# Patient Record
Sex: FEMALE | Race: BLACK OR AFRICAN AMERICAN | NOT HISPANIC OR LATINO | Employment: FULL TIME | URBAN - METROPOLITAN AREA
[De-identification: names, ages, dates, MRNs, and addresses within clinical notes are randomized per-mention and may not be internally consistent; named-entity substitution may affect disease eponyms.]

---

## 2017-04-12 ENCOUNTER — HOSPITAL ENCOUNTER (EMERGENCY)
Facility: HOSPITAL | Age: 42
Discharge: HOME | End: 2017-04-12
Attending: EMERGENCY MEDICINE
Payer: COMMERCIAL

## 2017-04-12 ENCOUNTER — HOSPITAL ENCOUNTER (EMERGENCY)
Facility: OTHER | Age: 42
Discharge: LEFT BEFORE DISCHARGE | End: 2017-04-12
Attending: EMERGENCY MEDICINE
Payer: COMMERCIAL

## 2017-04-12 VITALS
RESPIRATION RATE: 18 BRPM | HEIGHT: 63 IN | BODY MASS INDEX: 23.92 KG/M2 | OXYGEN SATURATION: 99 % | SYSTOLIC BLOOD PRESSURE: 188 MMHG | HEART RATE: 72 BPM | DIASTOLIC BLOOD PRESSURE: 91 MMHG | WEIGHT: 135 LBS | TEMPERATURE: 98 F

## 2017-04-12 VITALS
RESPIRATION RATE: 18 BRPM | WEIGHT: 135 LBS | HEIGHT: 63 IN | HEART RATE: 84 BPM | DIASTOLIC BLOOD PRESSURE: 75 MMHG | BODY MASS INDEX: 23.92 KG/M2 | TEMPERATURE: 99 F | OXYGEN SATURATION: 95 % | SYSTOLIC BLOOD PRESSURE: 148 MMHG

## 2017-04-12 DIAGNOSIS — R10.2 PELVIC PAIN AFFECTING PREGNANCY: ICD-10-CM

## 2017-04-12 DIAGNOSIS — I10 ESSENTIAL HYPERTENSION: ICD-10-CM

## 2017-04-12 DIAGNOSIS — O26.899 PELVIC PAIN AFFECTING PREGNANCY: ICD-10-CM

## 2017-04-12 DIAGNOSIS — R10.2 PELVIC PAIN IN FEMALE: Primary | ICD-10-CM

## 2017-04-12 LAB
ALBUMIN SERPL BCP-MCNC: 3.7 G/DL
ALP SERPL-CCNC: 43 U/L
ALT SERPL W/O P-5'-P-CCNC: 15 U/L
ANION GAP SERPL CALC-SCNC: 12 MMOL/L
ANISOCYTOSIS BLD QL SMEAR: SLIGHT
AST SERPL-CCNC: 27 U/L
B-HCG UR QL: NEGATIVE
BACTERIA #/AREA URNS HPF: ABNORMAL /HPF
BASOPHILS # BLD AUTO: 0.05 K/UL
BASOPHILS NFR BLD: 0.6 %
BILIRUB SERPL-MCNC: 0.3 MG/DL
BILIRUB UR QL STRIP: NEGATIVE
BUN SERPL-MCNC: 8 MG/DL
CALCIUM SERPL-MCNC: 9.1 MG/DL
CHLORIDE SERPL-SCNC: 101 MMOL/L
CLARITY UR: ABNORMAL
CO2 SERPL-SCNC: 24 MMOL/L
COLOR UR: YELLOW
CREAT SERPL-MCNC: 0.7 MG/DL
CTP QC/QA: YES
DIFFERENTIAL METHOD: ABNORMAL
EOSINOPHIL # BLD AUTO: 0.1 K/UL
EOSINOPHIL NFR BLD: 0.9 %
ERYTHROCYTE [DISTWIDTH] IN BLOOD BY AUTOMATED COUNT: 19.8 %
EST. GFR  (AFRICAN AMERICAN): >60 ML/MIN/1.73 M^2
EST. GFR  (NON AFRICAN AMERICAN): >60 ML/MIN/1.73 M^2
GLUCOSE SERPL-MCNC: 78 MG/DL
GLUCOSE UR QL STRIP: NEGATIVE
HCT VFR BLD AUTO: 27.9 %
HGB BLD-MCNC: 8.3 G/DL
HGB UR QL STRIP: ABNORMAL
HYPOCHROMIA BLD QL SMEAR: ABNORMAL
KETONES UR QL STRIP: NEGATIVE
LEUKOCYTE ESTERASE UR QL STRIP: ABNORMAL
LYMPHOCYTES # BLD AUTO: 1.4 K/UL
LYMPHOCYTES NFR BLD: 16 %
MCH RBC QN AUTO: 22 PG
MCHC RBC AUTO-ENTMCNC: 29.7 %
MCV RBC AUTO: 74 FL
MICROSCOPIC COMMENT: ABNORMAL
MONOCYTES # BLD AUTO: 0.5 K/UL
MONOCYTES NFR BLD: 5.8 %
NEUTROPHILS # BLD AUTO: 6.6 K/UL
NEUTROPHILS NFR BLD: 76.7 %
NITRITE UR QL STRIP: NEGATIVE
OVALOCYTES BLD QL SMEAR: ABNORMAL
PH UR STRIP: 7 [PH] (ref 5–8)
PLATELET # BLD AUTO: 469 K/UL
PLATELET BLD QL SMEAR: ABNORMAL
PMV BLD AUTO: 9.8 FL
POIKILOCYTOSIS BLD QL SMEAR: SLIGHT
POTASSIUM SERPL-SCNC: 3.8 MMOL/L
PROT SERPL-MCNC: 9 G/DL
PROT UR QL STRIP: NEGATIVE
RBC # BLD AUTO: 3.77 M/UL
RBC #/AREA URNS HPF: >100 /HPF (ref 0–4)
SODIUM SERPL-SCNC: 137 MMOL/L
SP GR UR STRIP: 1.01 (ref 1–1.03)
TARGETS BLD QL SMEAR: ABNORMAL
URN SPEC COLLECT METH UR: ABNORMAL
UROBILINOGEN UR STRIP-ACNC: NEGATIVE EU/DL
WBC # BLD AUTO: 8.67 K/UL
WBC #/AREA URNS HPF: 4 /HPF (ref 0–5)

## 2017-04-12 PROCEDURE — 99284 EMERGENCY DEPT VISIT MOD MDM: CPT | Mod: 25,27

## 2017-04-12 PROCEDURE — 85025 COMPLETE CBC W/AUTO DIFF WBC: CPT

## 2017-04-12 PROCEDURE — 96361 HYDRATE IV INFUSION ADD-ON: CPT

## 2017-04-12 PROCEDURE — 63600175 PHARM REV CODE 636 W HCPCS: Performed by: EMERGENCY MEDICINE

## 2017-04-12 PROCEDURE — 93005 ELECTROCARDIOGRAM TRACING: CPT

## 2017-04-12 PROCEDURE — 25000003 PHARM REV CODE 250: Performed by: EMERGENCY MEDICINE

## 2017-04-12 PROCEDURE — 80053 COMPREHEN METABOLIC PANEL: CPT

## 2017-04-12 PROCEDURE — 81000 URINALYSIS NONAUTO W/SCOPE: CPT

## 2017-04-12 PROCEDURE — 81025 URINE PREGNANCY TEST: CPT | Performed by: EMERGENCY MEDICINE

## 2017-04-12 PROCEDURE — 99284 EMERGENCY DEPT VISIT MOD MDM: CPT

## 2017-04-12 PROCEDURE — 96372 THER/PROPH/DIAG INJ SC/IM: CPT

## 2017-04-12 PROCEDURE — 96374 THER/PROPH/DIAG INJ IV PUSH: CPT

## 2017-04-12 PROCEDURE — 93010 ELECTROCARDIOGRAM REPORT: CPT | Mod: ,,, | Performed by: INTERNAL MEDICINE

## 2017-04-12 RX ORDER — KETOROLAC TROMETHAMINE 30 MG/ML
60 INJECTION, SOLUTION INTRAMUSCULAR; INTRAVENOUS
Status: COMPLETED | OUTPATIENT
Start: 2017-04-12 | End: 2017-04-12

## 2017-04-12 RX ORDER — LISINOPRIL 10 MG/1
20 TABLET ORAL DAILY
Qty: 30 TABLET | Refills: 6 | Status: SHIPPED | OUTPATIENT
Start: 2017-04-12 | End: 2018-01-26

## 2017-04-12 RX ORDER — KETOROLAC TROMETHAMINE 30 MG/ML
15 INJECTION, SOLUTION INTRAMUSCULAR; INTRAVENOUS
Status: DISCONTINUED | OUTPATIENT
Start: 2017-04-12 | End: 2017-04-12 | Stop reason: HOSPADM

## 2017-04-12 RX ORDER — HYDRALAZINE HYDROCHLORIDE 20 MG/ML
20 INJECTION INTRAMUSCULAR; INTRAVENOUS
Status: COMPLETED | OUTPATIENT
Start: 2017-04-12 | End: 2017-04-12

## 2017-04-12 RX ORDER — OXYCODONE AND ACETAMINOPHEN 7.5; 325 MG/1; MG/1
1 TABLET ORAL EVERY 4 HOURS PRN
Qty: 20 TABLET | Refills: 0 | Status: SHIPPED | OUTPATIENT
Start: 2017-04-12 | End: 2017-05-16

## 2017-04-12 RX ORDER — ONDANSETRON 4 MG/1
4 TABLET, ORALLY DISINTEGRATING ORAL
Status: COMPLETED | OUTPATIENT
Start: 2017-04-12 | End: 2017-04-12

## 2017-04-12 RX ORDER — DOXYCYCLINE 100 MG/1
100 CAPSULE ORAL 2 TIMES DAILY
Qty: 20 CAPSULE | Refills: 0 | Status: SHIPPED | OUTPATIENT
Start: 2017-04-12 | End: 2017-04-22

## 2017-04-12 RX ORDER — KETOROLAC TROMETHAMINE 30 MG/ML
30 INJECTION, SOLUTION INTRAMUSCULAR; INTRAVENOUS
Status: DISCONTINUED | OUTPATIENT
Start: 2017-04-12 | End: 2017-04-12

## 2017-04-12 RX ORDER — FERROUS SULFATE 325(65) MG
325 TABLET, DELAYED RELEASE (ENTERIC COATED) ORAL
COMMUNITY
End: 2017-05-16

## 2017-04-12 RX ORDER — LISINOPRIL 10 MG/1
10 TABLET ORAL DAILY
COMMUNITY
End: 2017-04-12

## 2017-04-12 RX ORDER — OXYCODONE AND ACETAMINOPHEN 7.5; 325 MG/1; MG/1
1 TABLET ORAL ONCE
Status: COMPLETED | OUTPATIENT
Start: 2017-04-12 | End: 2017-04-12

## 2017-04-12 RX ADMIN — ONDANSETRON 4 MG: 4 TABLET, ORALLY DISINTEGRATING ORAL at 02:04

## 2017-04-12 RX ADMIN — OXYCODONE HYDROCHLORIDE AND ACETAMINOPHEN 1 TABLET: 7.5; 325 TABLET ORAL at 02:04

## 2017-04-12 RX ADMIN — HYDRALAZINE HYDROCHLORIDE 20 MG: 20 INJECTION INTRAMUSCULAR; INTRAVENOUS at 02:04

## 2017-04-12 RX ADMIN — KETOROLAC TROMETHAMINE 60 MG: 30 INJECTION, SOLUTION INTRAMUSCULAR at 02:04

## 2017-04-12 RX ADMIN — SODIUM CHLORIDE 500 ML: 0.9 INJECTION, SOLUTION INTRAVENOUS at 03:04

## 2017-04-12 NOTE — ED NOTES
"Patient with c/o pelvic pain that started yesterday and has gotten worse. Patient rates pain 10/10. Patient states she has been having "white-thick" discharge x4 days.   "

## 2017-04-12 NOTE — ED AVS SNAPSHOT
[facility]  [address]  [place] [address]               George Lew   2017  1:17 PM   ED    Description:  Female : 1975   Department:  [facility]           Your Care was Coordinated By:     Provider Role From To    Perez Scott MD Attending Provider 17 0455 --      Reason for Visit     Pelvic Pain     Hypertension           Diagnoses this Visit        Comments    Pelvic pain in female    -  Primary     Pelvic pain affecting pregnancy         Essential hypertension           ED Disposition     None           To Do List           Follow-up Information     Schedule an appointment as soon as possible for a visit with Aparna Sapp MD.    Specialties:  Obstetrics, Obstetrics and Gynecology    Contact information:    [address]  [address]  [place] [address]  [phone]         These Medications        Disp Refills Start End    oxycodone-acetaminophen (PERCOCET) 7.5-325 mg per tablet 20 tablet 0 2017     Take 1 tablet by mouth every 4 (four) hours as needed for Pain. - Oral    doxycycline (VIBRAMYCIN) 100 MG Cap 20 capsule 0 2017    Take 1 capsule (100 mg total) by mouth 2 (two) times daily. - Oral    lisinopril 10 MG tablet 30 tablet 6 2017    Take 2 tablets (20 mg total) by mouth once daily. - Oral      [facility]     [facility] - 24 Assistance  Unless otherwise directed by your provider, please contact [facility], our nurse care line that is available for  assistance.     Registered nurses in the [facility] provide: appointment scheduling, clinical advisement, health education, and other advisory services.  Call: [phone] (toll free)               Medications           Message regarding Medications     Verify the changes and/or additions to your medication regime listed below are the same as discussed with your  clinician today.  If any of these changes or additions are incorrect, please notify your healthcare provider.        START taking these NEW medications        Refills    oxycodone-acetaminophen (PERCOCET) 7.5-325 mg per tablet 0    Sig: Take 1 tablet by mouth every 4 (four) hours as needed for Pain.    Class: Print    Route: Oral    doxycycline (VIBRAMYCIN) 100 MG Cap 0    Sig: Take 1 capsule (100 mg total) by mouth 2 (two) times daily.    Class: Print    Route: Oral    lisinopril 10 MG tablet 6    Sig: Take 2 tablets (20 mg total) by mouth once daily.    Class: Print    Route: Oral      These medications were administered today        Dose Freq    ketorolac injection 60 mg 60 mg ED 1 Time    Sig: Inject 60 mg into the muscle ED 1 Time.    Class: Normal    Route: Intramuscular    oxycodone-acetaminophen 7.5-325 mg per tablet 1 tablet 1 tablet Once    Sig: Take 1 tablet by mouth once.    Class: Normal    Route: Oral    ondansetron disintegrating tablet 4 mg 4 mg ED 1 Time    Sig: Take 1 tablet (4 mg total) by mouth ED 1 Time.    Class: Normal    Route: Oral    hydrALAZINE injection 20 mg 20 mg ED 1 Time    Sig: Inject 1 mL (20 mg total) into the vein ED 1 Time.    Class: Normal    Route: Intravenous    sodium chloride 0.9% bolus 500 mL 500 mL ED 1 Time    Sig: Inject 500 mLs into the vein ED 1 Time.    Class: Normal    Route: Intravenous           Verify that the below list of medications is an accurate representation of the medications you are currently taking.  If none reported, the list may be blank. If incorrect, please contact your healthcare provider. Carry this list with you in case of emergency.           Current Medications     doxycycline (VIBRAMYCIN) 100 MG Cap Take 1 capsule (100 mg total) by mouth 2 (two) times daily.    ferrous sulfate 325 (65 FE) MG EC tablet Take 325 mg by mouth 3 (three) times daily with meals.    lisinopril 10 MG tablet Take 2 tablets (20 mg total) by mouth once daily.     "norethindrone-ethinyl estradiol (OVCON) 0.4-35 mg-mcg per tablet Take 1 tablet by mouth once daily.    oxycodone-acetaminophen (PERCOCET) 7.5-325 mg per tablet Take 1 tablet by mouth every 4 (four) hours as needed for Pain.           Clinical Reference Information           Your Vitals Were     BP Pulse Temp Resp Height Weight    148/75 84 98.5 °F (36.9 °C) (Oral) 18 5' 3" (1.6 m) 61.2 kg (135 lb)    Last Period SpO2 BMI          03/15/2017 95% 23.91 kg/m2        Allergies as of 4/12/2017        Reactions    Pcn [Penicillins] Hives, Swelling      Immunizations Administered on Date of Encounter - 4/12/2017     None      ED Micro, Lab, POCT     Start Ordered       Status Ordering Provider    04/12/17 1531 04/12/17 1530  CBC auto differential  STAT      Final result     04/12/17 1531 04/12/17 1530  Comprehensive metabolic panel  STAT      Final result     04/12/17 1343 04/12/17 1342  C. trachomatis/N. gonorrhoeae by AMP DNA Vagina  STAT      Acknowledged     04/12/17 1343 04/12/17 1342  Vaginal Screen Vagina  STAT      Acknowledged       ED Imaging Orders     Start Ordered       Status Ordering Provider    04/12/17 1553 04/12/17 1531  US Pelvis Comp with Transvag NON-OB (xpd)  1 time imaging      Final result     04/12/17 1532 04/12/17 1531    1 time imaging,   Status:  Canceled      Canceled       Your Scheduled Appointments     May 16, 2017  2:00 PM CDT   New Patient with Davy Palomares MD   [facility] ([facility])    [address]  [facility] [address]   [phone]              [facility] Sign-Up     Activating your [facility] account is as easy as 1-2-3!     1) Visit [email or web address], select Sign Up Now, enter this activation code and your date of birth, then select Next.  [email or web address]  Expires: 5/27/2017  5:37 PM      2) Create a username and password to use when you visit [facility] in the future and select a security question in case you lose your password " and select Next.    3) Enter your e-mail address and click Sign Up!    Additional Information  If you have questions, please e-mail [email or web address] or call [phone] to talk to our [facility] staff. Remember, [facility] is NOT to be used for urgent needs. For medical emergencies, dial 911.          [facility] complies with applicable Federal civil rights laws and does not discriminate on the basis of race, color, national origin, age, disability, or sex.        Language Assistance Services     ATTENTION: Language assistance services are available, free of charge. Please call [phone].      ATENCIÓN: Si habla delbert, tiene a rooney disposición servicios gratuitos de asistencia lingüística. Llame al [phone].     CHÚ Ý: N?u b?n nói Ti?ng Vi?t, có các d?ch v? h? tr? ngôn ng? mi?n phí dành cho b?n. G?i s? [phone].

## 2017-04-12 NOTE — ED PROVIDER NOTES
"Encounter Date: 4/12/2017       History     Chief Complaint   Patient presents with    Pelvic Pain     since last night with thick, white vaginal discharge. was seen at [facility] and states that she left because she did not like the "doctor's attitude toward my visit there." recently started new BC 2 months ago, states due for period now    Hypertension     states no longer take BP meds because her MD told her she did not need it anymore, reports BP is up because she is in pain and "upset"     Review of patient's allergies indicates:   Allergen Reactions    Pcn [penicillins] Hives and Swelling     Patient is a 41 y.o. female presenting with the following complaint: vaginal discharge. The history is provided by the patient.   Vaginal Discharge   This is a new problem. Episode onset: few days ago. The problem occurs constantly. The problem has not changed since onset.Associated symptoms include abdominal pain. Nothing aggravates the symptoms.   She was seen at another emergency department this morning but left prior to treatment.  Patient says she underwent a blood transfusion a couple of weeks ago in [place] due to heavy vaginal bleeding.  She was told she may have fibroids and has a pelvic MRI scheduled.  She denies dysuria.  No fever or chills.  No lower back pain.  No nausea or vomiting. (Patient has had chronic pelvic pain.)  Patient says she has hypertension but has not required medication.  She presents to the ED with an elevated blood pressure which she says is due to pain.  Past Medical History:   Diagnosis Date    Hypertension     Iron deficiency      History reviewed. No pertinent surgical history.  History reviewed. No pertinent family history.  Social History   Substance Use Topics    Smoking status: Never Smoker    Smokeless tobacco: None    Alcohol use Yes     Review of Systems   Constitutional: Negative for chills and fever.   Gastrointestinal: Positive for abdominal pain. Negative for " nausea and vomiting.   Genitourinary: Positive for pelvic pain and vaginal discharge.   Musculoskeletal: Negative for back pain.   Skin: Negative for rash.   Neurological: Negative for dizziness, syncope and light-headedness.   All other systems reviewed and are negative.      Physical Exam   Initial Vitals   BP Pulse Resp Temp SpO2   04/12/17 1314 04/12/17 1314 04/12/17 1314 04/12/17 1314 04/12/17 1314   240/102 90 18 98.2 °F (36.8 °C) 95 %     Physical Exam    Nursing note and vitals reviewed.  Constitutional: She appears distressed.   HENT:   Head: Normocephalic and atraumatic.   Eyes: EOM are normal.   Neck: Normal range of motion. Neck supple.   Cardiovascular: Normal rate, regular rhythm and normal heart sounds.   Pulmonary/Chest: Breath sounds normal.   Abdominal: Soft.   Mild tenderness across the lower abdomen without guarding or rebound.  Bowel sounds are normal.   Genitourinary:   Genitourinary Comments: No vulvar or vaginal lesions.  Moderate blood in the vaginal vault.  Mild cervical motion tenderness.   Musculoskeletal: Normal range of motion. She exhibits no edema.   Neurological: She is alert and oriented to person, place, and time.   Skin: Skin is warm and dry.   Psychiatric: Her behavior is normal. Thought content normal.         ED Course   Procedures  Labs Reviewed   CBC W/ AUTO DIFFERENTIAL - Abnormal; Notable for the following:        Result Value    RBC 3.77 (*)     Hemoglobin 8.3 (*)     Hematocrit 27.9 (*)     MCV 74 (*)     MCH 22.0 (*)     MCHC 29.7 (*)     RDW 19.8 (*)     Platelets 469 (*)     Gran% 76.7 (*)     Lymph% 16.0 (*)     Platelet Estimate Increased (*)     All other components within normal limits   COMPREHENSIVE METABOLIC PANEL - Abnormal; Notable for the following:     Total Protein 9.0 (*)     Alkaline Phosphatase 43 (*)     All other components within normal limits   C. TRACHOMATIS/N. GONORRHOEAE BY AMP DNA   VAGINAL SCREEN             Medical Decision Making:   ED  Management:  41-year-old female who presents with a complaint of a vaginal discharge.  Patient began with heavy bleeding while in the ED making examination of this difficult.  Cultures will therefore not sent.  Ultrasound was performed showing thickened uterus but no fibroids.  Patient's been dealing with chronic pelvic pain which she is being treated in [place].  She has an MRI scheduled there.  She was also noted with an extremely high blood pressure which was treated with IV hydralazine.  Patient says she will be in town for the next couple of weeks and I will have her closely followed up here.  I will discharge her with prescriptions for Percocet, doxycycline and lisinopril.  She will return here if her bleeding or pain worsens prior to follow-up.                   ED Course     Clinical Impression:   Pelvic pain.  Hypertension. Vaginal bleeding.          Perez Scott MD  04/12/17 9780

## 2017-04-12 NOTE — ED PROVIDER NOTES
"Encounter Date: 4/12/2017    SCRIBE #1 NOTE: I, José Luis Katz, am scribing for, and in the presence of,  Dr. Garza. I have scribed the entire note.       History     Chief Complaint   Patient presents with    Pelvic Pain     pt reports pelvic pain that started yesterday with vaginal discharge; denies any dysuria/hematuria     Review of patient's allergies indicates:   Allergen Reactions    Pcn [penicillins] Hives and Swelling     HPI Comments: Time seen by provider: 11:31 AM    This is a 41 y.o. female with HTN who presents with complaint of pelvic pain. The pain has been present every month for the past several years but significantly worsened yesterday. She notes that she normally has an accompanying vaginal discharge but it became "thick and white" yesterday which is not typical of her discharge. She describes the pain as "worse than her usual menstrual cramps." She has been seeing her OB GYN in [place] who initially diagnosed her with fibroids but withdrew this diagnosis 3 weeks ago after she had an US. She does have a cyst on her left ovary. She was scheduled for an MRI last week but could not afford it. She was started on birth control 2 months ago which has not helped the pain and has intermittently been on Abx without relief. The pain has been relieved by Aleve in the past but she can no longer take it due to her anemia. Her last blood transfusion was 3 weeks ago. She denies fever, nausea, vomiting, vaginal bleeding, SOB, light headedness and urinary symptoms. She has an allergy to PCN.    The history is provided by the patient.     Past Medical History:   Diagnosis Date    Hypertension     Iron deficiency      History reviewed. No pertinent surgical history.  History reviewed. No pertinent family history.  Social History   Substance Use Topics    Smoking status: Never Smoker    Smokeless tobacco: None    Alcohol use Yes     Review of Systems   Constitutional: Negative for chills, diaphoresis and " fever.   HENT: Negative for congestion and sore throat.    Eyes: Negative for pain.   Respiratory: Negative for cough and shortness of breath.    Cardiovascular: Negative for chest pain.   Gastrointestinal: Negative for diarrhea, nausea and vomiting.   Genitourinary: Positive for pelvic pain and vaginal discharge. Negative for difficulty urinating, dysuria, frequency and vaginal bleeding.   Musculoskeletal: Negative for myalgias.   Skin: Negative for color change.   Neurological: Negative for syncope, weakness, light-headedness, numbness and headaches.   Psychiatric/Behavioral: Negative for behavioral problems.       Physical Exam   Initial Vitals   BP Pulse Resp Temp SpO2   04/12/17 1027 04/12/17 1027 04/12/17 1027 04/12/17 1027 04/12/17 1027   188/91 72 18 97.8 °F (36.6 °C) 99 %     Physical Exam    Nursing note and vitals reviewed.  Constitutional: She appears well-developed and well-nourished. She is not diaphoretic. No distress.   Patient appears uncomfortable.    HENT:   Head: Normocephalic and atraumatic.   Mouth/Throat: Oropharynx is clear and moist.   Eyes: Conjunctivae and EOM are normal. Pupils are equal, round, and reactive to light. Right eye exhibits no discharge. Left eye exhibits no discharge.   Neck: Normal range of motion. Neck supple.   Cardiovascular: Normal rate, regular rhythm, normal heart sounds and intact distal pulses. Exam reveals no gallop and no friction rub.    No murmur heard.  Pulmonary/Chest: Breath sounds normal. No respiratory distress. She has no wheezes. She has no rhonchi. She has no rales.   Abdominal: Soft. Bowel sounds are normal. She exhibits no distension. There is tenderness (lower abdominal tenderness). There is no rebound and no guarding.   Genitourinary:   Genitourinary Comments: Patient left AMA prior to pelvic exam   Musculoskeletal: Normal range of motion. She exhibits no edema or tenderness.   Extremities are atraumatic   Neurological: She is alert and oriented to  "person, place, and time. She has normal strength. No sensory deficit.   No neurologic deficits.    Skin: Skin is warm and dry. No rash and no abscess noted. No erythema. No pallor.   Psychiatric: She has a normal mood and affect. Her behavior is normal. Judgment and thought content normal.         ED Course   Procedures  Labs Reviewed   C. TRACHOMATIS/N. GONORRHOEAE BY AMP DNA   URINALYSIS   CBC W/ AUTO DIFFERENTIAL   COMPREHENSIVE METABOLIC PANEL   VAGINAL SCREEN   URINALYSIS MICROSCOPIC   POCT URINE PREGNANCY             Medical Decision Making:   Initial Assessment:   Patient with recurrent pelvic pain that sounds like it is related to her menses. She had a recent US. Her pregnancy test was negative. I will give a dose of Toradol since anti-inflammatories seem to help the pain. I will check basic labs and urine for infection. I will also do a pelvic exam.   Clinical Tests:   Lab Tests: Ordered and Reviewed  ED Management:  11:53 AM - Soon after my initial assessment the patient asked to sign out AMA. I am not sure what the shortcoming was and I asked if there was something we could do to change her decision and she said "No, I would like to go to another hospital."             Scribe Attestation:   Scribe #1: I performed the above scribed service and the documentation accurately describes the services I performed. I attest to the accuracy of the note.    Attending Attestation:           Physician Attestation for Scribe:  Physician Attestation Statement for Scribe #1: I, Dr. Garza, reviewed documentation, as scribed by José Luis Katz in my presence, and it is both accurate and complete.                 ED Course     Clinical Impression:     1. Pelvic pain in female       Disposition:   Disposition: JENNYFER Garza MD  04/12/17 1226    "

## 2017-04-12 NOTE — ED NOTES
Dr. Lin notified that patient with c/o palpitations. New orders received. NAD noted. Will continue to monitor.

## 2017-04-12 NOTE — ED NOTES
"Pt stating that she has a desire to leave hospital. Dr Garza, and charge nurse to room, Pt states "Im not a medicine person, and Im in pain, Im just going to leave". Pt encouraged to stay, and refused stating that she will go to [place]. Pt again encouraged to stay, and told to return to ED for worsening or continued symptoms.  "

## 2017-04-13 DIAGNOSIS — I10 HTN (HYPERTENSION): Primary | ICD-10-CM

## 2017-05-16 ENCOUNTER — OFFICE VISIT (OUTPATIENT)
Dept: OBSTETRICS AND GYNECOLOGY | Facility: CLINIC | Age: 42
End: 2017-05-16
Attending: OBSTETRICS & GYNECOLOGY
Payer: COMMERCIAL

## 2017-05-16 ENCOUNTER — LAB VISIT (OUTPATIENT)
Dept: LAB | Facility: OTHER | Age: 42
End: 2017-05-16
Attending: OBSTETRICS & GYNECOLOGY
Payer: COMMERCIAL

## 2017-05-16 VITALS — HEIGHT: 63 IN | WEIGHT: 138.88 LBS | BODY MASS INDEX: 24.61 KG/M2

## 2017-05-16 DIAGNOSIS — N92.1 MENORRHAGIA WITH IRREGULAR CYCLE: ICD-10-CM

## 2017-05-16 DIAGNOSIS — Z12.4 PAP SMEAR FOR CERVICAL CANCER SCREENING: ICD-10-CM

## 2017-05-16 DIAGNOSIS — D50.0 IRON DEFICIENCY ANEMIA DUE TO CHRONIC BLOOD LOSS: ICD-10-CM

## 2017-05-16 DIAGNOSIS — Z01.419 ENCOUNTER FOR GYNECOLOGICAL EXAMINATION: Primary | ICD-10-CM

## 2017-05-16 DIAGNOSIS — N89.8 VAGINAL DISCHARGE: ICD-10-CM

## 2017-05-16 DIAGNOSIS — Z11.51 SCREENING FOR HPV (HUMAN PAPILLOMAVIRUS): ICD-10-CM

## 2017-05-16 LAB
ANISOCYTOSIS BLD QL SMEAR: SLIGHT
BASOPHILS # BLD AUTO: 0.08 K/UL
BASOPHILS NFR BLD: 2.1 %
DIFFERENTIAL METHOD: ABNORMAL
EOSINOPHIL # BLD AUTO: 0.1 K/UL
EOSINOPHIL NFR BLD: 2.1 %
ERYTHROCYTE [DISTWIDTH] IN BLOOD BY AUTOMATED COUNT: 18.2 %
HCT VFR BLD AUTO: 31.4 %
HGB BLD-MCNC: 9.1 G/DL
HYPOCHROMIA BLD QL SMEAR: ABNORMAL
LYMPHOCYTES # BLD AUTO: 1.9 K/UL
LYMPHOCYTES NFR BLD: 50.5 %
MCH RBC QN AUTO: 21.3 PG
MCHC RBC AUTO-ENTMCNC: 29 %
MCV RBC AUTO: 73 FL
MONOCYTES # BLD AUTO: 0.3 K/UL
MONOCYTES NFR BLD: 8.2 %
NEUTROPHILS # BLD AUTO: 1.4 K/UL
NEUTROPHILS NFR BLD: 37.1 %
PLATELET # BLD AUTO: 388 K/UL
PLATELET BLD QL SMEAR: ABNORMAL
PMV BLD AUTO: 10.1 FL
POLYCHROMASIA BLD QL SMEAR: ABNORMAL
RBC # BLD AUTO: 4.28 M/UL
SCHISTOCYTES BLD QL SMEAR: PRESENT
TSH SERPL DL<=0.005 MIU/L-ACNC: 1.15 UIU/ML
WBC # BLD AUTO: 3.78 K/UL

## 2017-05-16 PROCEDURE — 99386 PREV VISIT NEW AGE 40-64: CPT | Mod: S$GLB,,, | Performed by: OBSTETRICS & GYNECOLOGY

## 2017-05-16 PROCEDURE — 88175 CYTOPATH C/V AUTO FLUID REDO: CPT

## 2017-05-16 PROCEDURE — 85025 COMPLETE CBC W/AUTO DIFF WBC: CPT

## 2017-05-16 PROCEDURE — 99999 PR PBB SHADOW E&M-EST. PATIENT-LVL III: CPT | Mod: PBBFAC,,, | Performed by: OBSTETRICS & GYNECOLOGY

## 2017-05-16 PROCEDURE — 84443 ASSAY THYROID STIM HORMONE: CPT

## 2017-05-16 PROCEDURE — 87086 URINE CULTURE/COLONY COUNT: CPT

## 2017-05-16 PROCEDURE — 83520 IMMUNOASSAY QUANT NOS NONAB: CPT

## 2017-05-16 PROCEDURE — 87624 HPV HI-RISK TYP POOLED RSLT: CPT

## 2017-05-16 PROCEDURE — 36415 COLL VENOUS BLD VENIPUNCTURE: CPT

## 2017-05-16 PROCEDURE — 87480 CANDIDA DNA DIR PROBE: CPT

## 2017-05-16 RX ORDER — FERROUS SULFATE 325(65) MG
325 TABLET ORAL 3 TIMES DAILY
COMMUNITY

## 2017-05-16 NOTE — MR AVS SNAPSHOT
[facility]  [address], [address]  [facility] [address]  Phone: [phone]  Fax: [phone]                  George Lew   2017 2:00 PM   Office Visit    Description:  Female : 1975   Provider:  Davy Palomares MD   Department:  [facility]           Reason for Visit     New pt.           Diagnoses this Visit        Comments    Encounter for gynecological examination    -  Primary     Menorrhagia with irregular cycle         Pap smear for cervical cancer screening         Screening for HPV (human papillomavirus)                To Do List           Goals (5 Years of Data)     None      Follow-Up and Disposition     Return in about 4 weeks (around 2017).      [facility] On Call     [facility] On Call Nurse Care Line -  Assistance  Unless otherwise directed by your provider, please contact [facility], our nurse care line that is available for  assistance.     Registered nurses in the [facility] On Call Center provide: appointment scheduling, clinical advisement, health education, and other advisory services.  Call: [phone] (toll free)               Medications           Message regarding Medications     Verify the changes and/or additions to your medication regime listed below are the same as discussed with your clinician today.  If any of these changes or additions are incorrect, please notify your healthcare provider.        STOP taking these medications     oxycodone-acetaminophen (PERCOCET) 7.5-325 mg per tablet Take 1 tablet by mouth every 4 (four) hours as needed for Pain.    ferrous sulfate 325 (65 FE) MG EC tablet Take 325 mg by mouth 3 (three) times daily with meals.           Verify that the below list of medications is an accurate representation of the medications you are currently taking.  If none reported, the list may be blank. If incorrect, please contact your healthcare provider. Carry this list with you in case of emergency.     "       Current Medications     ferrous sulfate 325 mg (65 mg iron) Tab tablet Take 325 mg by mouth.    lisinopril 10 MG tablet Take 2 tablets (20 mg total) by mouth once daily.    norethindrone-ethinyl estradiol (OVCON) 0.4-35 mg-mcg per tablet Take 1 tablet by mouth once daily.           Clinical Reference Information           Your Vitals Were     Height Weight BMI          5' 3" (1.6 m) 63 kg (138 lb 14.2 oz) 24.6 kg/m2        Allergies as of 5/16/2017     Pcn [Penicillins]      Immunizations Administered on Date of Encounter - 5/16/2017     None      Orders Placed During Today's Visit      Normal Orders This Visit    HPV High Risk Genotypes, PCR     Liquid-based pap smear, screening     Future Labs/Procedures Expected by Expires    ANTIMULLERIAN HORMONE ([facility])  5/16/2017 7/15/2018    CBC auto differential  5/16/2017 7/15/2018    US Pelvis Comp with Transvag NON-OB (xpd  5/16/2017 5/16/2018      [facility] Sign-Up     Activating your [facility] account is as easy as 1-2-3!     1) Visit [email or web address], select Sign Up Now, enter this activation code and your date of birth, then select Next.  [email or web address]  Expires: 5/27/2017  5:37 PM      2) Create a username and password to use when you visit [facility] in the future and select a security question in case you lose your password and select Next.    3) Enter your e-mail address and click Sign Up!    Additional Information  If you have questions, please e-mail [email or web address] or call [phone] to talk to our [facility] staff. Remember, [facility] is NOT to be used for urgent needs. For medical emergencies, dial 911.         Language Assistance Services     ATTENTION: Language assistance services are available, free of charge. Please call [phone].      ATENCIÓN: Si habla español, tiene a rooney disposición servicios gratuitos de asistencia lingüística. Llame al [phone].     CHÚ Ý: N?u b?n nói Ti?ng Vi?t, có các d?ch v? h? tr? ngôn ng? mi?n terrie rodriguez " luke marques?chet TAN?i s? [phone].         [facility] complies with applicable Federal civil rights laws and does not discriminate on the basis of race, color, national origin, age, disability, or sex.

## 2017-05-16 NOTE — PROGRESS NOTES
CC: Well woman exam- new patient      George Lew is a 41 y.o. female  presents for a well woman exam.  She is NOT established.  LMP: No LMP recorded..   Patient lives out of town in [place].  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    Annual Exam, last pap  abnormal per pt. Blood transfusion in 2017 for heavy cycles then was started on ocp's. lives in [place], works for airline. C/o constant discharge x 6 mnths.   41-year-old  who presents with a history of menorrhagia and anemia.  Status post a blood transfusion in March.  Since then she's been on OCPs.  Her bleeding is improved but still has heavy with menstrual cramps.  Patient does not want to be on OCPs secondary to desire for pregnancy.  We discussed testing AMH a CBC and a TSH.  We discussed the need for iron therapy secondary to anemia.  We also discussed that she will need an ultrasound so that I can better evaluate her fibroids which were diagnosed in [place].         Health Maintenance   Topic Date Due    Lipid Panel  1973    TETANUS VACCINE  1991    Pap Smear  1994    Mammogram  2013    Influenza Vaccine  2017         Past Medical History:   Diagnosis Date    Abnormal Pap smear of cervix     Anemia     Anxiety and depression     Fibroids     H/O trichomoniasis     History of blood transfusion 2017    History of chlamydia     Hypertension     Iron deficiency     Sjogren's disease        Past Surgical History:   Procedure Laterality Date     SECTION         OB History    Para Term  AB SAB TAB Ectopic Multiple Living   4 2 2  2     2      # Outcome Date GA Lbr Juanpablo/2nd Weight Sex Delivery Anes PTL Lv   4 AB            3 AB            2 Term         Y   1 Term      CS-LTranv   Y          Family History   Problem Relation Age of Onset    Aneurysm Father     Lupus Mother     Down syndrome Brother     Diabetes Maternal Aunt      "Breast cancer Neg Hx     Colon cancer Neg Hx        Social History   Substance Use Topics    Smoking status: Never Smoker    Smokeless tobacco: None    Alcohol use Yes       Ht 5' 3" (1.6 m)  Wt 63 kg (138 lb 14.2 oz)  BMI 24.6 kg/m2      ROS:  GENERAL: Denies weight gain or weight loss. Feeling well overall.   SKIN: Denies rash or lesions.   HEAD: Denies head injury or headache.   NODES: Denies enlarged lymph nodes.   CHEST: Denies chest pain or shortness of breath.   CARDIOVASCULAR: Denies palpitations or left sided chest pain.   ABDOMEN: No abdominal pain, constipation, diarrhea, nausea, vomiting or rectal bleeding.   URINARY: No frequency, dysuria, hematuria, or burning on urination.  REPRODUCTIVE: See HPI.   BREASTS: The patient performs breast self-examination and denies pain, lumps, or nipple discharge.   HEMATOLOGIC: No easy bruisability or excessive bleeding.  MUSCULOSKELETAL: Denies joint pain or swelling.   NEUROLOGIC: Denies syncope or weakness.   PSYCHIATRIC: Denies depression, anxiety or mood swings.    Physical Exam:    APPEARANCE: Well nourished, well developed, in no acute distress.  AFFECT: WNL, alert and oriented x 3  SKIN: No acne or hirsutism  ABDOMEN: Soft.  No tenderness or masses.  No hepatosplenomegaly.  No hernias.  BREASTS: Symmetrical, no skin changes or visible lesions.  No palpable masses, nipple discharge bilaterally.  PELVIC: Normal external genitalia without lesions.  Normal hair distribution.  Adequate perineal body, normal urethral meatus.  Vagina moist and well rugated without lesions or discharge.  Cervix pink, without lesions, discharge or tenderness.  No significant cystocele or rectocele.  Bimanual exam shows uterus to be normal size, regular, mobile and nontender.  Adnexa without masses or tenderness.    EXTREMITIES: No edema.    ASSESSMENT AND PLAN  1. Encounter for gynecological examination     2. Menorrhagia with irregular cycle  US Pelvis Comp with Transvag NON-OB " (xpd    ANTIMULLERIAN HORMONE (AMH)    CBC auto differential    TSH   3. Pap smear for cervical cancer screening  Liquid-based pap smear, screening   4. Screening for HPV (human papillomavirus)  HPV High Risk Genotypes, PCR   5. Iron deficiency anemia due to chronic blood loss  TSH   6. Vaginal discharge   affirm was done today which was positive for bacterial vaginosis.  Prescription sent in for treatment.         Patient was counseled today on A.C.S. Pap guidelines and recommendations for yearly pelvic exams, mammograms and monthly self breast exams; to see her PCP for other health maintenance.     Return in about 3 weeks (around 6/6/2017) for u/s.    All

## 2017-05-17 LAB
CANDIDA RRNA VAG QL PROBE: NEGATIVE
G VAGINALIS RRNA GENITAL QL PROBE: POSITIVE
T VAGINALIS RRNA GENITAL QL PROBE: NEGATIVE

## 2017-05-18 LAB
BACTERIA UR CULT: NORMAL
MIS SERPL-MCNC: <0.1 NG/ML (ref 0.9–9.5)

## 2017-05-18 RX ORDER — METRONIDAZOLE 500 MG/1
500 TABLET ORAL EVERY 12 HOURS
Qty: 14 TABLET | Refills: 0 | Status: SHIPPED | OUTPATIENT
Start: 2017-05-18 | End: 2017-05-25

## 2017-05-18 NOTE — TELEPHONE ENCOUNTER
Called pt and left detailed message that per Dr. Palomares her vaginal swab came back positive for bacterial vaginosis. Informed that Dr. Palomares will be sending in a prescription of flagyl for her to her pharmacy. Allergies and pharmacy are up to date.     ----- Message from Davy Palomares MD sent at 5/17/2017  9:02 PM CDT -----  Please call vaginosis screen came back positive for bacterial vaginosis.  This is a bacteria that when it overgrows can cause of vaginal discharge and a vaginal odor.  Is easily treated with antibiotics.  Please send in Flagyl 500 mg twice a day ×7 days and remind patient not to drink alcohol while taking these antibiotics.

## 2017-05-18 NOTE — PROGRESS NOTES
Please call vaginosis screen came back positive for bacterial vaginosis.  This is a bacteria that when it overgrows can cause of vaginal discharge and a vaginal odor.  Is easily treated with antibiotics.  Please send in Flagyl 500 mg twice a day ×7 days and remind patient not to drink alcohol while taking these antibiotics.

## 2017-05-22 LAB
HPV16 DNA SPEC QL NAA+PROBE: NEGATIVE
HPV16+18+H RISK 12 DNA CVX-IMP: NEGATIVE
HPV18 DNA SPEC QL NAA+PROBE: NEGATIVE

## 2017-05-23 ENCOUNTER — TELEPHONE (OUTPATIENT)
Dept: OBSTETRICS AND GYNECOLOGY | Facility: CLINIC | Age: 42
End: 2017-05-23

## 2017-05-23 NOTE — TELEPHONE ENCOUNTER
Pt returning Dr. Palomares's call. She said that Dr. Palomares had left her a message to call her back regarding her lab results.

## 2017-05-24 ENCOUNTER — TELEPHONE (OUTPATIENT)
Dept: OBSTETRICS AND GYNECOLOGY | Facility: CLINIC | Age: 42
End: 2017-05-24

## 2017-05-24 NOTE — TELEPHONE ENCOUNTER
Dr. Palomares-- Pt states that she is returning Dr. Palomares's call from yesterday morning. Pt's # [phone].

## 2017-05-24 NOTE — PROGRESS NOTES
Spoke to patient.  Results reviewed.  AMH exceedingly low.  The chances for spontaneous pregnancy low.  We'll need assistance if desired.  At this time patient does not desire to follow-up with fertility specialist.  She has a follow-up ultrasound with me at the end of the month secondary to a 5 cm simple cyst found in the ER.  Patient also anemic.  Will need workup at this June visit also.

## 2017-05-24 NOTE — TELEPHONE ENCOUNTER
Spoke with patient informed you were in surgery today that you will call her back tomorrow regarding results.

## 2017-06-02 ENCOUNTER — TELEPHONE (OUTPATIENT)
Dept: OBSTETRICS AND GYNECOLOGY | Facility: CLINIC | Age: 42
End: 2017-06-02

## 2017-06-02 RX ORDER — ESTRADIOL 0.1 MG/G
1 CREAM VAGINAL DAILY
Qty: 42.5 G | Refills: 1 | Status: SHIPPED | OUTPATIENT
Start: 2017-06-02 | End: 2017-06-27 | Stop reason: ALTCHOICE

## 2017-06-02 NOTE — TELEPHONE ENCOUNTER
I sent a rx for estrace cream to use nightly for a week then 3 x weekly until next visit  This will help over time and should start feeling better by month end

## 2017-06-02 NOTE — TELEPHONE ENCOUNTER
Left detailed message stating that Dr. Palomares sent in estrace cream for her and she is to use it nightly for a week and then 3x a week until her next visit. Advised pt to call back with any questions.

## 2017-06-02 NOTE — TELEPHONE ENCOUNTER
40 y/o patient was seen on 5-16-17 & tested positive for BV. Finished the Flagyl she was given.   However, she thinks it is back but her symptoms seem more like vaginal dryness. C/o dryness, itchiness, white grit build-up. She has been trying not to wear underwear lately to see if it elevates the symptoms. She does have Srogens's disease. Offered appt. To see NP next wk but pt. Has u/s appt. with Bone at the end of the mnth & doesn't want to come in right now again.   Notified pt. we would get back w/ her by Monday since  Is out

## 2017-06-05 ENCOUNTER — NURSE TRIAGE (OUTPATIENT)
Dept: ADMINISTRATIVE | Facility: CLINIC | Age: 42
End: 2017-06-05

## 2017-06-05 NOTE — TELEPHONE ENCOUNTER
Reason for Disposition   Caller has medication question only, adult not sick, and triager answers question    Protocols used: ST MEDICATION QUESTION CALL-A-  pt requesting prescription called into a different pharmacy/       prescription for estrace called into [facility] [phone] as pt requested as p/naomi Yañez RN

## 2017-06-27 ENCOUNTER — OFFICE VISIT (OUTPATIENT)
Dept: OBSTETRICS AND GYNECOLOGY | Facility: CLINIC | Age: 42
End: 2017-06-27
Attending: OBSTETRICS & GYNECOLOGY
Payer: COMMERCIAL

## 2017-06-27 VITALS
SYSTOLIC BLOOD PRESSURE: 144 MMHG | WEIGHT: 142.5 LBS | HEIGHT: 62 IN | DIASTOLIC BLOOD PRESSURE: 90 MMHG | BODY MASS INDEX: 26.22 KG/M2

## 2017-06-27 DIAGNOSIS — M35.00 SJOGREN'S SYNDROME, WITH UNSPECIFIED ORGAN INVOLVEMENT: ICD-10-CM

## 2017-06-27 DIAGNOSIS — N89.8 VAGINAL IRRITATION: ICD-10-CM

## 2017-06-27 DIAGNOSIS — N83.202 CYST OF LEFT OVARY: Primary | ICD-10-CM

## 2017-06-27 DIAGNOSIS — N91.5 OLIGOMENORRHEA: ICD-10-CM

## 2017-06-27 DIAGNOSIS — N89.8 VAGINAL DRYNESS: ICD-10-CM

## 2017-06-27 PROCEDURE — 87480 CANDIDA DNA DIR PROBE: CPT

## 2017-06-27 PROCEDURE — 99213 OFFICE O/P EST LOW 20 MIN: CPT | Mod: S$GLB,,, | Performed by: OBSTETRICS & GYNECOLOGY

## 2017-06-27 PROCEDURE — 99999 PR PBB SHADOW E&M-EST. PATIENT-LVL III: CPT | Mod: PBBFAC,,, | Performed by: OBSTETRICS & GYNECOLOGY

## 2017-06-27 RX ORDER — TRIAMCINOLONE ACETONIDE 1 MG/G
OINTMENT TOPICAL
Qty: 30 G | Refills: 0 | Status: SHIPPED | OUTPATIENT
Start: 2017-06-27 | End: 2018-01-26

## 2017-06-27 RX ORDER — FLUCONAZOLE 150 MG/1
150 TABLET ORAL ONCE
Qty: 1 TABLET | Refills: 1 | Status: SHIPPED | OUTPATIENT
Start: 2017-06-27 | End: 2017-06-27

## 2017-06-27 NOTE — PROGRESS NOTES
CC: Well woman exam    George Lew is a 41 y.o. female  presents for a well woman exam.  She is established.  LMP: No LMP recorded (within months)..   41-year-old with a history of Sjogren syndrome.  Complaining of vaginal irritation and dryness.  Tried using estrogen cream but that did not help much.  Was treated for bacterial vaginosis and symptoms improved slightly but have not totally resolved.  Here today for follow-up also of 5 cm ovarian cyst.  Ultrasound performed today and continues to have 5 cm simple ovarian cyst on the left ovary.  Unchanged from 2 months ago.  In discussion with patient regarding options for 5 cm cyst, the patient would like to try natural herbal remedies for the next month prior to surgery.  She adamantly wants to try taking apple cider vinegar ???  Patient denies any abdominal pain or symptoms.  Due to the fact the patient is asymptomatic we will continue to observe the cyst and repeat ultrasound in 2 months.  Patient states that she has not had a period since in the past 2 months since stopping OCPs.  Hert AMH was poor at 0.1.  However at this time she does not want to follow-up with fertility.  She states that her last period was 2 months ago after stopping her OCPs.  She has not had a period since.     Health Maintenance   Topic Date Due    Lipid Panel  1973    TETANUS VACCINE  1991    Mammogram  2013    Influenza Vaccine  2017    Pap Smear with HPV Cotest  2020         Past Medical History:   Diagnosis Date    Abnormal Pap smear of cervix     Anemia     Anxiety and depression     Fibroids     H/O trichomoniasis     History of blood transfusion     History of chlamydia     Hypertension     Iron deficiency     Sjogren's disease        Past Surgical History:   Procedure Laterality Date     SECTION         OB History    Para Term  AB Living   4 2 2   2 2   SAB TAB Ectopic Multiple Live Births           " 2      # Outcome Date GA Lbr Juanpablo/2nd Weight Sex Delivery Anes PTL Lv   4 AB            3 AB            2 Term         JOYCELYN   1 Term      CS-LTranv   JOYCELYN          Family History   Problem Relation Age of Onset    Aneurysm Father     Lupus Mother     Down syndrome Brother     Diabetes Maternal Aunt     Breast cancer Neg Hx     Colon cancer Neg Hx        Social History   Substance Use Topics    Smoking status: Never Smoker    Smokeless tobacco: Never Used    Alcohol use Yes       BP (!) 144/90   Ht 5' 2" (1.575 m)   Wt 64.7 kg (142 lb 8.4 oz)   LMP  (Within Months) Comment: within the past 2-3months   BMI 26.07 kg/m²       ROS:  GENERAL: Denies weight gain or weight loss. Feeling well overall.   SKIN: Denies rash or lesions.   HEAD: Denies head injury or headache.   NODES: Denies enlarged lymph nodes.   CHEST: Denies chest pain or shortness of breath.   CARDIOVASCULAR: Denies palpitations or left sided chest pain.   ABDOMEN: No abdominal pain, constipation, diarrhea, nausea, vomiting or rectal bleeding.   URINARY: No frequency, dysuria, hematuria, or burning on urination.  REPRODUCTIVE: See HPI.   BREASTS: The patient performs breast self-examination and denies pain, lumps, or nipple discharge.   HEMATOLOGIC: No easy bruisability or excessive bleeding.  MUSCULOSKELETAL: Denies joint pain or swelling.   NEUROLOGIC: Denies syncope or weakness.   PSYCHIATRIC: Denies depression, anxiety or mood swings.    Physical Exam:    APPEARANCE: Well nourished, well developed, in no acute distress.  AFFECT: WNL, alert and oriented x 3  SKIN: No acne or hirsutism  ABDOMEN: Soft.  No tenderness or masses.  No hepatosplenomegaly.  No hernias.  BREASTS: Symmetrical, no skin changes or visible lesions.  No palpable masses, nipple discharge bilaterally.  PELVIC: Normal external genitalia without lesions.  Slight erythema noted to bilateral labia.  Normal hair distribution.  Adequate perineal body, normal urethral meatus.  " Vagina moist and well rugated without lesions or discharge.  Cervix pink, without lesions, discharge or tenderness.  No significant cystocele or rectocele.  Bimanual exam shows uterus to be normal size, regular, mobile and nontender.  Adnexa slight adnexal fullness on the left.  But no tenderness  EXTREMITIES: No edema.    ASSESSMENT AND PLAN  1. Cyst of left ovary  US Pelvis Comp with Transvag NON-OB (xpd      Patient wants to try natural herbal remedies over the next month.  We'll repeat ultrasound in 6 weeks.  And if this persists then patient is been agreeable to proceed with laparoscopic resection of ovarian cyst.    2. Oligomenorrhea  Follicle stimulating hormone   I'm concerned that patient  may be perimenopausal secondary to oligomenorrhea and no period since stopping OCPs decreased AMH    3. Vaginal irritation  triamcinolone acetonide 0.1% (KENALOG) 0.1 % ointment will try triamcinolone cream twice a day for 2 weeks and then once a day for the next 4 weeks.  Will also give Diflucan ×1 for external vaginal redness.     4. Sjogren's syndrome, with unspecified organ involvement   patient with Sjogren's syndrome and has vaginal dryness.  Tried estrogen cream however patient notes no improvement.     5. Vaginal dryness         Patient was counseled today on A.C.S. Pap guidelines and recommendations for yearly pelvic exams, mammograms and monthly self breast exams; to see her PCP for other health maintenance.     Return in about 7 weeks (around 8/15/2017), or f/u u/sd and f/u to dryness.

## 2017-06-30 ENCOUNTER — TELEPHONE (OUTPATIENT)
Dept: OBSTETRICS AND GYNECOLOGY | Facility: CLINIC | Age: 42
End: 2017-06-30

## 2017-06-30 RX ORDER — METRONIDAZOLE 500 MG/1
500 TABLET ORAL 2 TIMES DAILY
Qty: 14 TABLET | Refills: 0 | Status: SHIPPED | OUTPATIENT
Start: 2017-06-30 | End: 2017-10-26 | Stop reason: SDUPTHER

## 2017-06-30 NOTE — PROGRESS NOTES
Please call her vaginosis screen was positive for BV.  I sent in Flagyl 500 mg to take twice a day to her pharmacy.

## 2017-06-30 NOTE — TELEPHONE ENCOUNTER
Spoke with patient informed results. Informed the Flagyl is at the pharmacy advised pt she can not drink on the Rx.     Patient wanting to know what can be done since she keeps getting the BV and doing treatment and its not working. She said she is still having the dryness and itching and the cream has not helped. She wants to know what we can do for this. She said could it be from the hormones?

## 2017-06-30 NOTE — TELEPHONE ENCOUNTER
----- Message from Davy Palomares MD sent at 6/30/2017 11:13 AM CDT -----  Please call her vaginosis screen was positive for bacterial vaginosis.  I sent in Flagyl 500 mg to be taken twice a day to her pharmacy.  I think this is the cause of some of her continued irritation.

## 2017-06-30 NOTE — PROGRESS NOTES
Please call her vaginosis screen was positive for bacterial vaginosis.  I sent in Flagyl 500 mg to be taken twice a day to her pharmacy.  I think this is the cause of some of her continued irritation.

## 2017-07-01 NOTE — TELEPHONE ENCOUNTER
We need to get the BV cleared up first before the irriation will get beteter. Take flagyl and use triamcinolone steroid cream as that will reduce irritation.  Once she is finished with Flagyl I want her to use OTC pH  called Refresh and use it 3 x week to keep vaginal pH in check. Then f/u one month with our NP to make sure that BV has cleared

## 2017-07-07 ENCOUNTER — TELEPHONE (OUTPATIENT)
Dept: OBSTETRICS AND GYNECOLOGY | Facility: CLINIC | Age: 42
End: 2017-07-07

## 2017-07-07 NOTE — TELEPHONE ENCOUNTER
Spoke with patient informed the information from . Patient is going to  the medication and do the otc ph  and call back in a couple of weeks to schedule follow up appointment with Np.

## 2017-08-15 ENCOUNTER — TELEPHONE (OUTPATIENT)
Dept: OBSTETRICS AND GYNECOLOGY | Facility: CLINIC | Age: 42
End: 2017-08-15

## 2017-08-15 DIAGNOSIS — N93.9 ABNORMAL UTERINE BLEEDING (AUB): Primary | ICD-10-CM

## 2017-08-15 NOTE — TELEPHONE ENCOUNTER
Scheduled US and appt with Nely at 1300.  Aware of US prep.  Advised if bleeding continues to be heavy or she starts feeling worse she should go to ED

## 2017-08-15 NOTE — TELEPHONE ENCOUNTER
Pt is on her period and reports heavy vaginal bleeding, changing 2 super pads/tampon in an hour.  States she is weak and nauseated and having severe pelvic pain.  She had a blood transfusion about 4 months ago.  She was recently seen in June for pelvic pain and had an ultrasound.  Pt is requesting an appt with Dr. Palomares.  Advised if she is bleeding that heavily and with her symptoms she should go to the ER [facility].  Pt states they wont do anything for her in the ED.

## 2017-08-15 NOTE — TELEPHONE ENCOUNTER
Can she come in tomorrow and see Nely with an u/s    I will be in [place] in case she needs me

## 2017-08-16 ENCOUNTER — TELEPHONE (OUTPATIENT)
Dept: OBSTETRICS AND GYNECOLOGY | Facility: CLINIC | Age: 42
End: 2017-08-16

## 2017-08-18 NOTE — TELEPHONE ENCOUNTER
Pt is upset that she was unable to get the US when she was here 2 days ago and the way she was treated.  She wants Dr. Palomares to call her with the results from the last US that was done on 6/27 and wants to know what MD is looking for with a new ultrasound and why she needs another one.  Pt states the pain is unbearable when she gets her period.  She is not able to take Aleve because of her blood transfusion.  She had a history of fibroids but the last US they were not able to see any fibroids.

## 2017-08-29 ENCOUNTER — TELEPHONE (OUTPATIENT)
Dept: OBSTETRICS AND GYNECOLOGY | Facility: CLINIC | Age: 42
End: 2017-08-29

## 2017-08-29 NOTE — TELEPHONE ENCOUNTER
pt called on 08/16/2017 for results and was very upset regarding the u/s. Please look at that encounter before calling patient. It shows that encounter was signed so I am not sure if you ever got it.

## 2017-08-29 NOTE — TELEPHONE ENCOUNTER
Called today and left a detailed message. Patient with 2 problems.  1. simple 5 cm ovarian cyst which we were following up with ultrasound  2.Fibroids and anemia which need to be addressed.  Patient has been declining hysterectomy or definitive surgery.     The reason the ultrasound was not completed per my research is that her bladder was not full.    Left a message in detail on her cell phone for her to call me.  She does live in [place] and is not always here in town.

## 2017-08-29 NOTE — TELEPHONE ENCOUNTER
Pt says she is still waiting to hear back from Dr Palomares about her u/s results. Pt says she called last week and was told Dr Palomares would call her.

## 2017-09-05 NOTE — TELEPHONE ENCOUNTER
I had already called pt on the day that she called 8/29 but there was no answer.   Left a message and then left another meddage again today    Trying to explain that u/s was to f/u ovarian cyst   She left prior to u/s bc bladder was not full

## 2017-09-06 ENCOUNTER — TELEPHONE (OUTPATIENT)
Dept: OBSTETRICS AND GYNECOLOGY | Facility: CLINIC | Age: 42
End: 2017-09-06

## 2017-09-11 ENCOUNTER — TELEPHONE (OUTPATIENT)
Dept: OBSTETRICS AND GYNECOLOGY | Facility: CLINIC | Age: 42
End: 2017-09-11

## 2017-09-11 NOTE — TELEPHONE ENCOUNTER
Pt is calling again still hasn't heard from dr auguste. Pt is off of work today and would like dr auguste to please call her back to discuss u/s.Pt # [phone]

## 2017-09-14 NOTE — TELEPHONE ENCOUNTER
Have called back and left 2 messages again.  Instructed I will call her again in am- at 9 am  and again at 12 pm and to please answer the phone if available

## 2017-09-15 DIAGNOSIS — D64.9 ANEMIA, UNSPECIFIED TYPE: Primary | ICD-10-CM

## 2017-09-26 NOTE — TELEPHONE ENCOUNTER
Spoke to pt on 9/15 and she will come in for f/u u/s of cyst   Her periods cont to be very heavty and she is now ready to proceed with hyst  She will call office and sched appt

## 2017-09-27 ENCOUNTER — LAB VISIT (OUTPATIENT)
Dept: LAB | Facility: HOSPITAL | Age: 42
End: 2017-09-27
Attending: OBSTETRICS & GYNECOLOGY
Payer: COMMERCIAL

## 2017-09-27 DIAGNOSIS — N91.5 OLIGOMENORRHEA: ICD-10-CM

## 2017-09-27 DIAGNOSIS — D64.9 ANEMIA, UNSPECIFIED TYPE: ICD-10-CM

## 2017-09-27 DIAGNOSIS — N83.202 CYST OF LEFT OVARY: ICD-10-CM

## 2017-09-27 LAB
BASOPHILS # BLD AUTO: 0.06 K/UL
BASOPHILS NFR BLD: 1.4 %
CANCER AG125 SERPL-ACNC: 124 U/ML
DIFFERENTIAL METHOD: ABNORMAL
EOSINOPHIL # BLD AUTO: 0.1 K/UL
EOSINOPHIL NFR BLD: 1.7 %
ERYTHROCYTE [DISTWIDTH] IN BLOOD BY AUTOMATED COUNT: 18.2 %
FSH SERPL-ACNC: 8.9 MIU/ML
HCT VFR BLD AUTO: 31.3 %
HGB BLD-MCNC: 9.1 G/DL
LYMPHOCYTES # BLD AUTO: 1.5 K/UL
LYMPHOCYTES NFR BLD: 35.3 %
MCH RBC QN AUTO: 22.3 PG
MCHC RBC AUTO-ENTMCNC: 29.1 G/DL
MCV RBC AUTO: 77 FL
MONOCYTES # BLD AUTO: 0.5 K/UL
MONOCYTES NFR BLD: 12.2 %
NEUTROPHILS # BLD AUTO: 2.1 K/UL
NEUTROPHILS NFR BLD: 49.4 %
PLATELET # BLD AUTO: 441 K/UL
PMV BLD AUTO: 10.6 FL
RBC # BLD AUTO: 4.08 M/UL
WBC # BLD AUTO: 4.19 K/UL

## 2017-09-27 PROCEDURE — 86304 IMMUNOASSAY TUMOR CA 125: CPT

## 2017-09-27 PROCEDURE — 85025 COMPLETE CBC W/AUTO DIFF WBC: CPT

## 2017-09-27 PROCEDURE — 83001 ASSAY OF GONADOTROPIN (FSH): CPT

## 2017-10-03 NOTE — PROGRESS NOTES
Pt works for [employer] and is only in town approximately 1 day or 2 per week.  For years she lived in [place] and all of her doctors have been in [place].  Approximately 4 years ago her gynecologist has been wanting her to have a hysterectomy because of big fibroids and heavy bleeding.  Somewhere around 2-4 years ago the gynecologist sent her to the oncologist in [place] who also recommended for her to have surgery.  The patient never did schedule.  She declined.  She was sent to have an MRI this past May 2017 in [place] but she did not get a period.  She presented to [facility] emergency room with pelvic pain and an ultrasound was performed where she had a 5 cm simple cyst.  Patient presented to me in June 2017 where this cyst persisted.  I recommended a CA-125 but the patient did not get that lab drawn.  I also recommended a follow-up ultrasound in 6-8 weeks.  During that visit we discussed that back in [place] she had recently had a blood transfusion in May 2017 due to menorrhagia.  We discussed that she most likely needed a hysterectomy secondary to the heavy bleeding but the patient was not wanting to give up hopes on a future pregnancy.  Long discussion with patient and an AMH was drawn as her window for pregnancy is nearly closed.   Patient re-presented in August for an ultrasound but her bladder was not full.  While waiting for her bladder to be full she got frustrated and left.  I called her back and she came back in for an ultrasound last week as I stressed to her the importance of following up on the cyst.  Patient states that she has not had a period since July or August.  Her main complaint is severe dysmenorrhea when her periods do, that has gotten worse over the last several years.  Ultrasound performed shows a 4 cm simple cyst and enlarged uterus with non-discreet fibroids.  Also reports possible adenomyosis.  CA-125 was drawn which was elevated at 124.  All of these results were discussed with patient.  We  will schedule a CT scan of her abdomen and pelvis as well as draw a ova-1 on Monday.  The patient reports that she will only be in town on Monday and is leaving to fly out to [place] on Tuesday.  She will not be back in town until the following Tuesday.  We will schedule an appointment with Dr. Leonard when she gets back in town to follow-up on all of the above findings.  I did tell patient that there was no doubt that she was going to need a hysterectomy and at least her one ovary out.  Patient is still hesitant but she understands my concerns.  I told her we would take one step at a time get a CAT scan back and then make a game plan but she will definitely need surgery.    Noni,  I need an appointment for Monday for CT scan.  I also need an ova 1 and CMP to be drawn on Monday.  Once the CAT scan is scheduled at [facility] we need to call the patient and let her know what time and also to get some additional lab work.  I also need an appointment with Dr. Leonard for the following week after that  as she is only available that week Tuesday through Friday.

## 2017-10-05 ENCOUNTER — TELEPHONE (OUTPATIENT)
Dept: OBSTETRICS AND GYNECOLOGY | Facility: CLINIC | Age: 42
End: 2017-10-05

## 2017-10-05 DIAGNOSIS — R97.1 ELEVATED CA-125: Primary | ICD-10-CM

## 2017-10-05 NOTE — TELEPHONE ENCOUNTER
Pt. Notified that CT Scan is scheduled for 10-09 @ 10am. However, she cannot do 10-09, she wants 10-10 (Tuesday). I changed it to Tuesday at [facility] @ 10am.   Called pt. Back but did not answer therefore left her a detailed message

## 2017-10-16 ENCOUNTER — TELEPHONE (OUTPATIENT)
Dept: OBSTETRICS AND GYNECOLOGY | Facility: CLINIC | Age: 42
End: 2017-10-16

## 2017-10-16 NOTE — TELEPHONE ENCOUNTER
LMOR for pt. To return call to see why she didn't go for CT scan last wk?  is looking for results.

## 2017-10-16 NOTE — TELEPHONE ENCOUNTER
Pt. Aware she is scheduled for 10-18-17 (Wed) @ 2pm aware to be there 2 hours beforehand. Nothing to eat/drink 4 hours prior.

## 2017-10-18 ENCOUNTER — HOSPITAL ENCOUNTER (OUTPATIENT)
Dept: RADIOLOGY | Facility: OTHER | Age: 42
Discharge: HOME | End: 2017-10-18
Attending: OBSTETRICS & GYNECOLOGY
Payer: COMMERCIAL

## 2017-10-18 DIAGNOSIS — R97.1 ELEVATED CA-125: Primary | ICD-10-CM

## 2017-10-18 DIAGNOSIS — R97.1 ELEVATED CA-125: ICD-10-CM

## 2017-10-18 PROCEDURE — 25500020 PHARM REV CODE 255: Performed by: OBSTETRICS & GYNECOLOGY

## 2017-10-18 PROCEDURE — 74177 CT ABD & PELVIS W/CONTRAST: CPT | Mod: 26,,, | Performed by: RADIOLOGY

## 2017-10-18 PROCEDURE — 74177 CT ABD & PELVIS W/CONTRAST: CPT | Mod: TC

## 2017-10-18 RX ADMIN — IOHEXOL 75 ML: 350 INJECTION, SOLUTION INTRAVENOUS at 03:10

## 2017-10-18 NOTE — PROGRESS NOTES
Please call Dr Aisha Bradley and see if Aisha can see this pt on Friday or Tuesday  Dx elevated  with 4-5 cm ovarian cyst

## 2017-10-26 ENCOUNTER — TELEPHONE (OUTPATIENT)
Dept: OBSTETRICS AND GYNECOLOGY | Facility: CLINIC | Age: 42
End: 2017-10-26

## 2017-10-26 DIAGNOSIS — B96.89 BV (BACTERIAL VAGINOSIS): Primary | ICD-10-CM

## 2017-10-26 DIAGNOSIS — N76.0 BV (BACTERIAL VAGINOSIS): Primary | ICD-10-CM

## 2017-10-26 RX ORDER — METRONIDAZOLE 500 MG/1
500 TABLET ORAL 2 TIMES DAILY
Qty: 14 TABLET | Refills: 0 | Status: SHIPPED | OUTPATIENT
Start: 2017-10-26 | End: 2018-01-26

## 2017-10-26 NOTE — TELEPHONE ENCOUNTER
Pt. States she cannot go to the appt. I scheduled her with  on 10-30-17. She doesn't understand why she has to see this Dr.  I gave her the number to call & reschedule herself since her schedule is so difficult.

## 2017-10-26 NOTE — TELEPHONE ENCOUNTER
Noni   Thank you for the updat.    I have had numerous conversations with Ms Mia that her  is elevated and that is a protein in the blood that can be associated with ovarain cancer.   I have told her this at least in 4 separate converations.   She has an ovarian cyst with an elevated  and she needs to see the ovarian cancer specialist because she needs surgery. I have told her this.    Call her tomorrow and make sure she booked another appointment

## 2017-10-26 NOTE — TELEPHONE ENCOUNTER
Pt. States she is having vaginal odor, itching, & discharge again & would like a medication sent in.

## 2017-10-27 NOTE — TELEPHONE ENCOUNTER
TANIAOR in detail stating why she needs to see .  I think she cancelled that appt. yest when I gave her the number but it looks like she didn't reschedule.

## 2017-10-31 ENCOUNTER — TELEPHONE (OUTPATIENT)
Dept: OBSTETRICS AND GYNECOLOGY | Facility: CLINIC | Age: 42
End: 2017-10-31

## 2017-11-27 ENCOUNTER — TELEPHONE (OUTPATIENT)
Dept: OBSTETRICS AND GYNECOLOGY | Facility: CLINIC | Age: 42
End: 2017-11-27

## 2017-11-27 NOTE — TELEPHONE ENCOUNTER
Pt was calling to speak to Noni about her appt that she had scheduled, it got moved to December. Also she has some paperwork she needs  to fill out so she wants to see if it would be okay to drop it off to the office.  The pt also said the rx that was prescribed for pain for her menstrual cramps is not working so she would like to see if she can get something else sent to the pharm for her.

## 2018-01-26 ENCOUNTER — INITIAL CONSULT (OUTPATIENT)
Dept: GYNECOLOGIC ONCOLOGY | Facility: CLINIC | Age: 43
End: 2018-01-26
Payer: COMMERCIAL

## 2018-01-26 ENCOUNTER — LAB VISIT (OUTPATIENT)
Dept: LAB | Facility: OTHER | Age: 43
End: 2018-01-26
Attending: OBSTETRICS & GYNECOLOGY
Payer: COMMERCIAL

## 2018-01-26 VITALS
WEIGHT: 143.31 LBS | SYSTOLIC BLOOD PRESSURE: 155 MMHG | BODY MASS INDEX: 26.21 KG/M2 | DIASTOLIC BLOOD PRESSURE: 80 MMHG | HEART RATE: 89 BPM

## 2018-01-26 DIAGNOSIS — R97.1 ELEVATED CA-125: ICD-10-CM

## 2018-01-26 DIAGNOSIS — R19.00 PELVIC MASS IN FEMALE: Primary | ICD-10-CM

## 2018-01-26 DIAGNOSIS — R19.00 PELVIC MASS IN FEMALE: ICD-10-CM

## 2018-01-26 DIAGNOSIS — N93.9 ABNORMAL UTERINE BLEEDING (AUB): ICD-10-CM

## 2018-01-26 LAB — CANCER AG125 SERPL-ACNC: 122 U/ML

## 2018-01-26 PROCEDURE — 36415 COLL VENOUS BLD VENIPUNCTURE: CPT

## 2018-01-26 PROCEDURE — 99999 PR PBB SHADOW E&M-EST. PATIENT-LVL II: CPT | Mod: PBBFAC,,, | Performed by: OBSTETRICS & GYNECOLOGY

## 2018-01-26 PROCEDURE — 86304 IMMUNOASSAY TUMOR CA 125: CPT

## 2018-01-26 PROCEDURE — 99245 OFF/OP CONSLTJ NEW/EST HI 55: CPT | Mod: S$GLB,,, | Performed by: OBSTETRICS & GYNECOLOGY

## 2018-01-26 RX ORDER — IBUPROFEN 800 MG/1
800 TABLET ORAL EVERY 8 HOURS PRN
Qty: 30 TABLET | Refills: 2 | Status: ON HOLD | OUTPATIENT
Start: 2018-01-26 | End: 2018-06-08 | Stop reason: HOSPADM

## 2018-01-26 NOTE — LETTER
January 26, 2018      Davy Palomares MD  [address]  [address]  [facility] [address]           [facility] - Gynecologic Oncology  [address], [address]  [facility] [address]  Phone: [phone]  Fax: [phone]          Patient: George Lew   MR Number: [id number]   YOB: 1975   Date of Visit: 1/26/2018       Dear Dr. Davy Palomares:    Thank you for referring George Lew to me for evaluation. Attached you will find relevant portions of my assessment and plan of care.    If you have questions, please do not hesitate to call me. I look forward to following George Lew along with you.    Sincerely,    Eliseo Leonard MD    Enclosure  CC:  No Recipients    If you would like to receive this communication electronically, please contact [email or web address] or [phone] to request more information on [employer] access.    For providers and/or their staff who would like to refer a patient to [facility], please contact us through our one-stop-shop provider referral line, [facility], at [phone].    If you feel you have received this communication in error or would no longer like to receive these types of communications, please e-mail [email or web address]

## 2018-01-26 NOTE — PROGRESS NOTES
Subjective:      Patient ID: George Lew is a 42 y.o. female.    Chief Complaint: elvated ca125 and ovarian cycst      HPI  Here today at the request of Dr. Palomares secondary to elevated CA-125 and persistent ovarian cyst.  As is well documented in her medical record, the patient has a long-standing h/o menorrhagia, dysmenorrhea, and an enlarged uterus.  For the past few years she has been encouraged to undergo hysterectomy.  When Dr. Palomares saw her, she had an U/S that revealed a 4 cm right ovarian cyst and 11 cm uterus.  CA-125 was elevated at 124.  Subsequent CT confirmed the U/S findings, and did not show ascites or anything else concerning.  She continues to have monthly painful cycles.  Ready for definitive management.  Denies FH breast or gyn cancer.  Only abdominal surgery is a C/S.  Review of Systems   Constitutional: Negative for activity change, appetite change, chills, fatigue and fever.   HENT: Negative for hearing loss, mouth sores, nosebleeds, sore throat and tinnitus.    Eyes: Negative for visual disturbance.   Respiratory: Negative for cough, chest tightness, shortness of breath and wheezing.    Cardiovascular: Negative for chest pain and leg swelling.   Gastrointestinal: Negative for abdominal distention, abdominal pain, blood in stool, constipation, diarrhea, nausea and vomiting.   Genitourinary: Positive for menstrual problem and pelvic pain. Negative for dysuria, flank pain, frequency, hematuria, vaginal bleeding, vaginal discharge and vaginal pain.   Musculoskeletal: Negative for arthralgias and back pain.   Skin: Negative for rash.   Neurological: Negative for dizziness, seizures, syncope, weakness and numbness.   Hematological: Does not bruise/bleed easily.   Psychiatric/Behavioral: Negative for confusion and sleep disturbance. The patient is not nervous/anxious.        Past Medical History:   Diagnosis Date    Abnormal Pap smear of cervix     Anemia     Anxiety and depression      Fibroids     H/O trichomoniasis     History of blood transfusion 2017    History of chlamydia     Hypertension     Iron deficiency     Sjogren's disease      Past Surgical History:   Procedure Laterality Date     SECTION       Family History   Problem Relation Age of Onset    Aneurysm Father     Lupus Mother     Down syndrome Brother     Diabetes Maternal Aunt     Breast cancer Neg Hx     Colon cancer Neg Hx      Social History     Social History    Marital status: Single     Spouse name: N/A    Number of children: N/A    Years of education: N/A     Occupational History    Not on file.     Social History Main Topics    Smoking status: Never Smoker    Smokeless tobacco: Never Used    Alcohol use Yes    Drug use: No    Sexual activity: Yes     Other Topics Concern    Not on file     Social History Narrative    No narrative on file     Current Outpatient Prescriptions   Medication Sig    ferrous sulfate 325 mg (65 mg iron) Tab tablet Take 325 mg by mouth.     No current facility-administered medications for this visit.      Review of patient's allergies indicates:   Allergen Reactions    Pcn [penicillins] Hives and Swelling       Objective:   Physical Exam:   Constitutional: She is oriented to person, place, and time. She appears well-developed and well-nourished. No distress.    HENT:   Head: Normocephalic and atraumatic.    Eyes: No scleral icterus.    Neck: Normal range of motion. Neck supple.    Cardiovascular: Normal rate and intact distal pulses.  Exam reveals no cyanosis and no edema.     Pulmonary/Chest: Effort normal. No respiratory distress. She exhibits no tenderness.        Abdominal: Soft. Normal appearance. She exhibits mass (palpable, tender fundus inferior to umbilicus, mobile). She exhibits no distension, no fluid wave and no ascites. There is no tenderness. There is no rigidity, no rebound and no guarding. No hernia.     Genitourinary: Rectum normal and vagina normal.  Pelvic exam was performed with patient supine. There is no rash, tenderness or lesion on the right labia. There is no rash, tenderness or lesion on the left labia. Uterus is deviated, enlarged and hosting fibroids. Uterus is not fixed and not tender. Cervix is normal. Right adnexum displays no mass, no tenderness and no fullness. Left adnexum displays no mass, no tenderness and no fullness. No bleeding in the vagina. No vaginal discharge found. Labial bartholins normal.Cervix exhibits motion tenderness. Cervix exhibits no discharge and no friability.        Uterus Size: 11 cm   Musculoskeletal: Normal range of motion and moves all extremeties. She exhibits no edema.      Lymphadenopathy:     She has no cervical adenopathy.        Right: No inguinal adenopathy present.        Left: No inguinal adenopathy present.    Neurological: She is alert and oriented to person, place, and time.    Skin: Skin is warm. No rash noted. No cyanosis or erythema.    Psychiatric: She has a normal mood and affect. Thought content normal.       Assessment:     1. Pelvic mass in female    2. Elevated CA-125    3. Abnormal uterine bleeding (AUB)        Plan:       Spoke with the patient about her symptoms and radiographic, exam, and lab findings.  I suspect this is adenomyosis and that is why her uterus is enlarged and CA-125 is elevated, because if this were cancer she would have progressed by this point.  I will repeat her marker today to see if there is a trend.  I agree with Dr. Palomares that she needs surgery.  If CA-125 is decreased or stable, Dr. Palomares should be able to perform RALH/USO versus BSO.  Patient is in agreement.  I will contact her after her labs return.  I don't feel she needs repeat imaging as her exam is c/w with prior scans.

## 2018-01-31 ENCOUNTER — TELEPHONE (OUTPATIENT)
Dept: GYNECOLOGIC ONCOLOGY | Facility: CLINIC | Age: 43
End: 2018-01-31

## 2018-01-31 NOTE — TELEPHONE ENCOUNTER
----- Message from Eliseo Leonard MD sent at 1/29/2018  5:29 AM CST -----  Please let her know CA-125 is stable and that she needs to schedule surgery with either Dr. Palomares or myself

## 2018-01-31 NOTE — TELEPHONE ENCOUNTER
01/31/18 advised pt per Dr. Leonard, ca125 is stable. Will need to schd  hyst with Dr. Leonard or Dr. Palomares. Pt is not ready to schd surg at this time. She will call back and let me know. MARY/MA

## 2018-03-16 ENCOUNTER — TELEPHONE (OUTPATIENT)
Dept: OBSTETRICS AND GYNECOLOGY | Facility: CLINIC | Age: 43
End: 2018-03-16

## 2018-03-16 NOTE — TELEPHONE ENCOUNTER
Dr. Palomares patient calling-has discharge,would like to come in.Please call patient back to discuss

## 2018-03-19 ENCOUNTER — OFFICE VISIT (OUTPATIENT)
Dept: OBSTETRICS AND GYNECOLOGY | Facility: CLINIC | Age: 43
End: 2018-03-19
Payer: COMMERCIAL

## 2018-03-19 ENCOUNTER — LAB VISIT (OUTPATIENT)
Dept: LAB | Facility: OTHER | Age: 43
End: 2018-03-19
Payer: COMMERCIAL

## 2018-03-19 VITALS
BODY MASS INDEX: 25.9 KG/M2 | HEIGHT: 63 IN | WEIGHT: 146.19 LBS | SYSTOLIC BLOOD PRESSURE: 136 MMHG | DIASTOLIC BLOOD PRESSURE: 84 MMHG

## 2018-03-19 DIAGNOSIS — N91.2 AMENORRHEA: ICD-10-CM

## 2018-03-19 DIAGNOSIS — Z20.2 POSSIBLE EXPOSURE TO STD: ICD-10-CM

## 2018-03-19 DIAGNOSIS — N89.8 VAGINAL DISCHARGE: Primary | ICD-10-CM

## 2018-03-19 PROBLEM — E04.9 GOITER: Status: ACTIVE | Noted: 2017-03-02

## 2018-03-19 LAB
B-HCG UR QL: NEGATIVE
C TRACH DNA SPEC QL NAA+PROBE: NOT DETECTED
CANDIDA RRNA VAG QL PROBE: NEGATIVE
CTP QC/QA: YES
G VAGINALIS RRNA GENITAL QL PROBE: POSITIVE
N GONORRHOEA DNA SPEC QL NAA+PROBE: NOT DETECTED
RPR SER QL: NORMAL
T VAGINALIS RRNA GENITAL QL PROBE: NEGATIVE

## 2018-03-19 PROCEDURE — 87491 CHLMYD TRACH DNA AMP PROBE: CPT

## 2018-03-19 PROCEDURE — 36415 COLL VENOUS BLD VENIPUNCTURE: CPT

## 2018-03-19 PROCEDURE — 86703 HIV-1/HIV-2 1 RESULT ANTBDY: CPT

## 2018-03-19 PROCEDURE — 99999 PR PBB SHADOW E&M-EST. PATIENT-LVL III: CPT | Mod: PBBFAC,,, | Performed by: NURSE PRACTITIONER

## 2018-03-19 PROCEDURE — 86592 SYPHILIS TEST NON-TREP QUAL: CPT

## 2018-03-19 PROCEDURE — 99213 OFFICE O/P EST LOW 20 MIN: CPT | Mod: SA,S$GLB,, | Performed by: NURSE PRACTITIONER

## 2018-03-19 PROCEDURE — 81025 URINE PREGNANCY TEST: CPT | Mod: S$GLB,,, | Performed by: NURSE PRACTITIONER

## 2018-03-19 PROCEDURE — 87480 CANDIDA DNA DIR PROBE: CPT

## 2018-03-19 PROCEDURE — 87340 HEPATITIS B SURFACE AG IA: CPT

## 2018-03-19 PROCEDURE — 3075F SYST BP GE 130 - 139MM HG: CPT | Mod: CPTII,S$GLB,, | Performed by: NURSE PRACTITIONER

## 2018-03-19 PROCEDURE — 3079F DIAST BP 80-89 MM HG: CPT | Mod: CPTII,S$GLB,, | Performed by: NURSE PRACTITIONER

## 2018-03-19 NOTE — PROGRESS NOTES
Chief Complaint   Patient presents with    Vaginal Discharge     (Bone pt)pt c/o clear vaginal discharge with an odor since friday       (Dr. Palomares patient)    Last PAP:  2017 Normal,  HPV negative      George Lew is a 42 y.o. female  presents with complaint of clear vaginal discharge with odor for past 4 days.  She denies itching. Patient's last menstrual period was 2018 (approximate). Patient still has menses, but can't remember if it was January or February that she had her last cycle.  Ms. Lew is currently sexually active with a single male partner.  She would like STD screening today for GC/CT/Trich, and serum screening.    Past Medical History:   Diagnosis Date    Abnormal Pap smear of cervix     Anemia     Anxiety and depression     Fibroids     H/O trichomoniasis     History of blood transfusion 2017    History of chlamydia     Hypertension     Iron deficiency     Sjogren's disease      Past Surgical History:   Procedure Laterality Date     SECTION       Social History   Substance Use Topics    Smoking status: Never Smoker    Smokeless tobacco: Never Used    Alcohol use Yes      Comment: occasional      Family History   Problem Relation Age of Onset    Aneurysm Father     Lupus Mother     Down syndrome Brother     Diabetes Maternal Aunt     Breast cancer Neg Hx     Colon cancer Neg Hx      OB History    Para Term  AB Living   4 2 2   2 2   SAB TAB Ectopic Multiple Live Births           2      # Outcome Date GA Lbr Juanpablo/2nd Weight Sex Delivery Anes PTL Lv   4 AB            3 AB            2 Term         JOYCELYN   1 Term      CS-LTranv   JOYCELYN            ROS:  GENERAL: No fever, chills, fatigue or weight loss.  VULVAR: denies pain, denies lesions and denies itching.  VAGINAL: denies itching, denies abnormal bleeding and denies lesions.  Reports clear discharge with odor.  ABDOMEN: denies abdominal pain. Denies nausea. Denies vomiting. No  "diarrhea. No constipation  BREAST: Denies pain. No lumps. No discharge.  URINARY: No incontinence, no nocturia, no frequency and no dysuria.  CARDIOVASCULAR: No chest pain. No shortness of breath. No leg cramps.  NEUROLOGICAL: No headaches. No vision changes.    Vitals:    03/19/18 0821   BP: 136/84   Weight: 66.3 kg (146 lb 2.6 oz)   Height: 5' 3" (1.6 m)   PainSc: 0-No pain     Body mass index is 25.89 kg/m².    PHYSICAL EXAM:   External genitalia: normal external genitalia, no erythema, no discharge  and urethra: normal appearing urethra with no masses, tenderness or lesions.   Vagina: normal appearing vagina with normal color and small amount clear discharge; no lesions.    Cervix: normal appearing cervix without discharge or lesions, cervical motion tenderness absent.   Uterus: uterus is normal size, shape, consistency and nontender. Adnexa:  normal adnexa and no mass, fullness, tenderness:  .    ASSESSMENT and PLAN:  Vaginal discharge    Amenorrhea  -     POCT urine pregnancy  (UPT neg in-office)    Possible exposure to STD  -     Vaginosis Screen by DNA Probe  -     C. trachomatis/N. gonorrhoeae by AMP DNA Cervicovaginal  -     RPR; Future; Expected date: 03/19/2018  -     HIV-1 and HIV-2 antibodies; Future; Expected date: 03/19/2018  -     Hepatitis B surface antigen; Future; Expected date: 03/19/2018      * Will call with results when finalized.    * Use of the [facility] Patient Portal discussed and encouraged during today's visit.     Self-Help Tips for Vulvar Skin Care  Prevention of recurrent vulvar and vaginal irritation often begins with the use of hypoallergenic products, plus avoidance of exposures that irritate sensitive skin.     Clothing and Laundry:  · Wear all-white cotton underwear.   · Do not wear pantyhose (wear thigh high or knee high hose instead).   · Wear loose-fitting pants or skirts.   · Remove wet bathing suits and exercise clothing promptly.   · Use hypoallergenic, dermatologically " approved detergent such as Tide Free, or All Free and Clear.  · Double-rinse underwear and any other clothing that comes into contact with the vulva.   · Do not use fabric softener on undergarments.  Hygiene:  · Use soft, white, unscented toilet paper.   · Use lukewarm or cool sitz baths to relieve burning and irritation.   · Avoid getting shampoo on the vulvar area.   · Do not use bubble bath, feminine hygiene products, or any perfumed creams or soaps.   · Wash the vulva with cool to lukewarm water only.  · Rinse the vulva with water after urination.  · Urinate before the bladder is full.   · Prevent constipation by adding fiber to your diet (if necessary, use a psyllium product such as Metamucil) and drinking at least 8 glasses of water daily.  · Use unscented menstrual pads and tampons. Do not wear panty liners daily.  Sexual intercourse:  · Use a lubricant that is water soluble, e.g., Astroglide or KY and unscented.  · Ask your physician for a prescription for a topical anesthestic, e.g., Lidocaine gel 5%. (This may sting for the first 3-5 minutes after application.)  · Apply ice or a frozen blue gel pack (lunch box size) wrapped in one layer of a hand towel to relieve burning after intercourse.   · Urinate (to prevent infection) and rinse vulva with cool water after sexual intercourse.  · Do not use contraceptive creams or spermicides.  Physical Activities:  · Avoid exercises that put direct pressure on the vulva such as bicycle riding and horseback riding.  · Limit intense exercises that create a lot of friction in the vulvar area (try lower intensity exercises such as walking).  · Use a frozen gel pack wrapped in a towel to relieve symptoms after exercise.   · Enroll in an exercise class such as yoga to learn stretching and relaxation exercises.  · Don't swim in highly chlorinated pools.   · Avoid the use of hot tubs.    To help maintain a healthy vaginal pH:  For vaginal discomfort or feminine odor, she may  use OTC Rephresh gel.  It is a vaginal gel used to neutralize and maintain a healthy vaginal pH.  Patients who get Yeast or BV often use this to help reduce risk of vaginal issues.  Maintaining a healthy pH helps beneficial bacteria to thrive and inhibits overgrowths of yeast and pathogenic bacteria.  It can be found at any drugstore on the feminine product aisle, and can be used as frequently as every 3 days.      FOLLOW UP: Follow-up if symptoms worsen or fail to improve.

## 2018-03-20 ENCOUNTER — TELEPHONE (OUTPATIENT)
Dept: OBSTETRICS AND GYNECOLOGY | Facility: CLINIC | Age: 43
End: 2018-03-20

## 2018-03-20 LAB
HBV SURFACE AG SERPL QL IA: NEGATIVE
HIV 1+2 AB+HIV1 P24 AG SERPL QL IA: NEGATIVE

## 2018-03-20 RX ORDER — TINIDAZOLE 500 MG/1
TABLET ORAL
Qty: 8 TABLET | Refills: 0 | Status: SHIPPED | OUTPATIENT
Start: 2018-03-20 | End: 2018-04-10 | Stop reason: SDUPTHER

## 2018-03-20 NOTE — TELEPHONE ENCOUNTER
----- Message from Nely Asher NP sent at 3/20/2018  2:26 PM CDT -----  Patient's AFFIRM swab was (+) for BV.    Please call her and let her know Rx sent for (Tinidazole x 2 days).  Remind patient she cannot drink while taking medication or for 3 days after completion of medication.      # Instruct patient to contact pharmacy to check on status of RX)

## 2018-04-09 NOTE — TELEPHONE ENCOUNTER
Dr. Palomares patient- needs a rx called in today. Doesn't know the name of it, but it was from 2 weeks ago.

## 2018-04-10 RX ORDER — TINIDAZOLE 500 MG/1
TABLET ORAL
Qty: 8 TABLET | Refills: 0 | Status: SHIPPED | OUTPATIENT
Start: 2018-04-10 | End: 2018-04-21

## 2018-04-10 NOTE — TELEPHONE ENCOUNTER
----- Message from Stefani Bauman sent at 4/9/2018  5:12 PM CDT -----  Contact: pt  Pt missed a call and would the nurse to return their call.    Pt can be reached at [phone]

## 2018-04-10 NOTE — TELEPHONE ENCOUNTER
"Bone pt- C/o Irritation, and vaginal discharge with odor. She was seen in office on 3/19 and + for BV. Tindamax was sent, which she finished. The pt states she did something she knew she wasn't supposed to do and used the same soap that irritates her, and also has been using the "same towel and washcloth" the entire time. Patient would like another Rx for Tindamax sent in to Pharm because Flagyl does not work for her. Aware she is to not drink alcohol while taking the medication or for 3 days after completion.     Allergies and Pharm UTD.   Tindamax pended  "

## 2018-04-21 ENCOUNTER — HOSPITAL ENCOUNTER (EMERGENCY)
Facility: HOSPITAL | Age: 43
Discharge: HOME | End: 2018-04-21
Attending: EMERGENCY MEDICINE
Payer: COMMERCIAL

## 2018-04-21 VITALS
HEIGHT: 63 IN | DIASTOLIC BLOOD PRESSURE: 91 MMHG | WEIGHT: 137 LBS | BODY MASS INDEX: 24.27 KG/M2 | RESPIRATION RATE: 18 BRPM | OXYGEN SATURATION: 100 % | SYSTOLIC BLOOD PRESSURE: 198 MMHG | HEART RATE: 78 BPM | TEMPERATURE: 99 F

## 2018-04-21 DIAGNOSIS — N92.0 MENORRHAGIA WITH REGULAR CYCLE: Primary | ICD-10-CM

## 2018-04-21 DIAGNOSIS — R06.02 SHORTNESS OF BREATH: ICD-10-CM

## 2018-04-21 LAB
ABO + RH BLD: NORMAL
ALBUMIN SERPL BCP-MCNC: 3.9 G/DL
ALP SERPL-CCNC: 58 U/L
ALT SERPL W/O P-5'-P-CCNC: 13 U/L
ANION GAP SERPL CALC-SCNC: 9 MMOL/L
AST SERPL-CCNC: 16 U/L
B-HCG UR QL: NEGATIVE
BACTERIA #/AREA URNS AUTO: NORMAL /HPF
BASOPHILS # BLD AUTO: 0.06 K/UL
BASOPHILS NFR BLD: 0.9 %
BILIRUB SERPL-MCNC: 0.2 MG/DL
BILIRUB UR QL STRIP: NEGATIVE
BLD GP AB SCN CELLS X3 SERPL QL: NORMAL
BUN SERPL-MCNC: 11 MG/DL
CALCIUM SERPL-MCNC: 9.3 MG/DL
CHLORIDE SERPL-SCNC: 103 MMOL/L
CLARITY UR REFRACT.AUTO: CLEAR
CO2 SERPL-SCNC: 26 MMOL/L
COLOR UR AUTO: YELLOW
CREAT SERPL-MCNC: 0.8 MG/DL
CTP QC/QA: YES
DIFFERENTIAL METHOD: ABNORMAL
EOSINOPHIL # BLD AUTO: 0.1 K/UL
EOSINOPHIL NFR BLD: 0.8 %
ERYTHROCYTE [DISTWIDTH] IN BLOOD BY AUTOMATED COUNT: 18.6 %
EST. GFR  (AFRICAN AMERICAN): >60 ML/MIN/1.73 M^2
EST. GFR  (NON AFRICAN AMERICAN): >60 ML/MIN/1.73 M^2
GLUCOSE SERPL-MCNC: 95 MG/DL
GLUCOSE UR QL STRIP: NEGATIVE
HCT VFR BLD AUTO: 30.9 %
HGB BLD-MCNC: 9.3 G/DL
HGB UR QL STRIP: NEGATIVE
IMM GRANULOCYTES # BLD AUTO: 0.01 K/UL
IMM GRANULOCYTES NFR BLD AUTO: 0.2 %
KETONES UR QL STRIP: ABNORMAL
LEUKOCYTE ESTERASE UR QL STRIP: NEGATIVE
LYMPHOCYTES # BLD AUTO: 1.9 K/UL
LYMPHOCYTES NFR BLD: 29.3 %
MCH RBC QN AUTO: 22.2 PG
MCHC RBC AUTO-ENTMCNC: 30.1 G/DL
MCV RBC AUTO: 74 FL
MICROSCOPIC COMMENT: NORMAL
MONOCYTES # BLD AUTO: 0.5 K/UL
MONOCYTES NFR BLD: 7.8 %
NEUTROPHILS # BLD AUTO: 4 K/UL
NEUTROPHILS NFR BLD: 61 %
NITRITE UR QL STRIP: NEGATIVE
NRBC BLD-RTO: 0 /100 WBC
PH UR STRIP: 5 [PH] (ref 5–8)
PLATELET # BLD AUTO: 377 K/UL
PMV BLD AUTO: 11 FL
POTASSIUM SERPL-SCNC: 3.4 MMOL/L
PROT SERPL-MCNC: 8.9 G/DL
PROT UR QL STRIP: NEGATIVE
RBC # BLD AUTO: 4.18 M/UL
RBC #/AREA URNS AUTO: 1 /HPF (ref 0–4)
SODIUM SERPL-SCNC: 138 MMOL/L
SP GR UR STRIP: 1.02 (ref 1–1.03)
SQUAMOUS #/AREA URNS AUTO: 1 /HPF
URN SPEC COLLECT METH UR: ABNORMAL
UROBILINOGEN UR STRIP-ACNC: NEGATIVE EU/DL
WBC # BLD AUTO: 6.55 K/UL
WBC #/AREA URNS AUTO: 3 /HPF (ref 0–5)

## 2018-04-21 PROCEDURE — 81001 URINALYSIS AUTO W/SCOPE: CPT

## 2018-04-21 PROCEDURE — 25000003 PHARM REV CODE 250

## 2018-04-21 PROCEDURE — 81025 URINE PREGNANCY TEST: CPT

## 2018-04-21 PROCEDURE — 80053 COMPREHEN METABOLIC PANEL: CPT

## 2018-04-21 PROCEDURE — 99284 EMERGENCY DEPT VISIT MOD MDM: CPT | Mod: ,,,

## 2018-04-21 PROCEDURE — 93010 ELECTROCARDIOGRAM REPORT: CPT | Mod: ,,, | Performed by: INTERNAL MEDICINE

## 2018-04-21 PROCEDURE — 96360 HYDRATION IV INFUSION INIT: CPT

## 2018-04-21 PROCEDURE — 85025 COMPLETE CBC W/AUTO DIFF WBC: CPT

## 2018-04-21 PROCEDURE — 86901 BLOOD TYPING SEROLOGIC RH(D): CPT

## 2018-04-21 PROCEDURE — 99284 EMERGENCY DEPT VISIT MOD MDM: CPT | Mod: 25

## 2018-04-21 RX ORDER — ONDANSETRON 4 MG/1
4 TABLET, ORALLY DISINTEGRATING ORAL
Status: DISCONTINUED | OUTPATIENT
Start: 2018-04-21 | End: 2018-04-21 | Stop reason: HOSPADM

## 2018-04-21 RX ORDER — MEDROXYPROGESTERONE ACETATE 10 MG/1
20 TABLET ORAL 3 TIMES DAILY
Qty: 42 TABLET | Refills: 0 | Status: SHIPPED | OUTPATIENT
Start: 2018-04-21 | End: 2018-04-28

## 2018-04-21 RX ORDER — ACETAMINOPHEN 500 MG
1000 TABLET ORAL
Status: COMPLETED | OUTPATIENT
Start: 2018-04-21 | End: 2018-04-21

## 2018-04-21 RX ADMIN — SODIUM CHLORIDE 1000 ML: 0.9 INJECTION, SOLUTION INTRAVENOUS at 05:04

## 2018-04-21 RX ADMIN — ACETAMINOPHEN 1000 MG: 500 TABLET, FILM COATED ORAL at 05:04

## 2018-04-21 NOTE — ED NOTES
Patient identifiers verified and correct for MS Lew  C/C: Vaginal bleeding  APPEARANCE: awake and alert in NAD.  SKIN: warm, dry and intact. No breakdown or bruising.  MUSCULOSKELETAL: Patient moving all extremities spontaneously, no obvious swelling or deformities noted. Ambulates independently.  RESPIRATORY: Denies shortness of breath.Respirations unlabored.   CARDIAC: Denies CP, 2+ distal pulses; no peripheral edema  ABDOMEN: S/ND/NT, Denies nausea  : voids spontaneously, denies difficulty, states vaginal bleeding, 2 pads per hopur  Neurologic: AAO x 4; follows commands equal strength in all extremities; denies numbness/tingling. Positive  dizziness Positive weakness

## 2018-04-21 NOTE — ED PROVIDER NOTES
Encounter Date: 2018       History     Chief Complaint   Patient presents with    Vaginal Bleeding     on menstrual cycle      42 y.o. female with medical history of anemia, Sjogern's, fibroids, anxiety presents to the ED with vaginal bleeding.  Patient reports going through one tampon and 2-3 pads every hour since 3 PM yesterday.  Patient seen by Dr. Palomares and Dr. Leonard for menorrhagia x 4 years. They ultimately recommend a hysterectomy but patient declines. Patient has had negative work up for malignancy. She does state that bleeding seems worse after taking ibuprofen.  Last LMP was 1 month ago and only lasted a few hours. Today she endorses pelvic pain, shortness of breath and fatigue.  Last Gyn appt, possible diagnosis ofs adenomyosis.          Review of patient's allergies indicates:   Allergen Reactions    Pcn [penicillins] Hives and Swelling     Past Medical History:   Diagnosis Date    Abnormal Pap smear of cervix     Anemia     Anxiety and depression     Fibroids     H/O trichomoniasis     History of blood transfusion 2017    History of chlamydia     Hypertension     Iron deficiency     Ovarian cyst     Pelvic mass     Sjogren's disease      Past Surgical History:   Procedure Laterality Date     SECTION       Family History   Problem Relation Age of Onset    Aneurysm Father     Lupus Mother     Down syndrome Brother     Diabetes Maternal Aunt     Breast cancer Neg Hx     Colon cancer Neg Hx      Social History   Substance Use Topics    Smoking status: Never Smoker    Smokeless tobacco: Never Used    Alcohol use Yes      Comment: none recently     Review of Systems   Constitutional: Positive for fatigue. Negative for chills, diaphoresis and fever.   Eyes: Negative for visual disturbance.   Respiratory: Positive for shortness of breath.    Cardiovascular: Negative for chest pain.   Gastrointestinal: Positive for nausea. Negative for abdominal pain and vomiting.    Genitourinary: Positive for pelvic pain, vaginal bleeding and vaginal pain. Negative for dysuria and flank pain.   Musculoskeletal: Negative for back pain, myalgias and neck pain.   Skin: Negative for rash.   Neurological: Negative for weakness, light-headedness and headaches.   Hematological: Does not bruise/bleed easily.   Psychiatric/Behavioral: The patient is not nervous/anxious.        Physical Exam     Initial Vitals [04/21/18 1537]   BP Pulse Resp Temp SpO2   (!) 181/124 (!) 126 20 98.6 °F (37 °C) 96 %      MAP       143         Physical Exam    Vitals reviewed.  Constitutional: Vital signs are normal. She appears well-developed and well-nourished. She is not diaphoretic. No distress.   HENT:   Head: Normocephalic and atraumatic.   Nose: Nose normal.   Mouth/Throat: Oropharynx is clear and moist.   Eyes: Conjunctivae and lids are normal. Pupils are equal, round, and reactive to light. Lids are everted and swept, no foreign bodies found.   Neck: Trachea normal and normal range of motion. Neck supple.   Cardiovascular: Normal rate, regular rhythm and normal pulses.   No murmur heard.  Abdominal: Soft. Normal appearance and bowel sounds are normal.   Genitourinary: Cervix exhibits no motion tenderness, no discharge and no friability. Right adnexum displays no mass, no tenderness and no fullness. Left adnexum displays no mass, no tenderness and no fullness. There is bleeding in the vagina. No erythema or tenderness in the vagina. No foreign body in the vagina. No signs of injury around the vagina. No vaginal discharge found.   Musculoskeletal: She exhibits no edema.   Neurological: She is alert and oriented to person, place, and time. No sensory deficit.   Skin: Skin is warm. Capillary refill takes less than 2 seconds. No cyanosis.   Psychiatric: She has a normal mood and affect.         ED Course   Procedures  Labs Reviewed   CBC W/ AUTO DIFFERENTIAL - Abnormal; Notable for the following:        Result Value     Hemoglobin 9.3 (*)     Hematocrit 30.9 (*)     MCV 74 (*)     MCH 22.2 (*)     MCHC 30.1 (*)     RDW 18.6 (*)     Platelets 377 (*)     All other components within normal limits   COMPREHENSIVE METABOLIC PANEL - Abnormal; Notable for the following:     Potassium 3.4 (*)     Total Protein 8.9 (*)     All other components within normal limits   URINALYSIS, REFLEX TO URINE CULTURE - Abnormal; Notable for the following:     Ketones, UA Trace (*)     All other components within normal limits    Narrative:     Preferred Collection Type->Urine, Clean Catch   URINALYSIS MICROSCOPIC    Narrative:     Preferred Collection Type->Urine, Clean Catch   POCT URINE PREGNANCY   TYPE & SCREEN             Medical Decision Making:   History:   Old Medical Records: I decided to obtain old medical records.  Old Records Summarized: records from clinic visits.  Initial Assessment:   42 y.o. female with medical history of anemia, Sjogern's, fibroids, anxiety presents to the ED with vaginal bleeding since yesterday. Saturating 2-3 pads and a tampon hourly. Patient seen by Dr. Palomares and Dr. Aisha gamble for menorrhagia. Patient has been taking alieve, ibuprofen for pain.   Differential Diagnosis:   DDX includes but is not limited to menorrhagia, DUB, ectopic pregnancy, hemorrhagic cyst. Considered but do not suspect TOA or ovarian torsion  Clinical Tests:   Lab Tests: Ordered and Reviewed  Medical Tests: Ordered and Reviewed  ED Management:  Will get labs and contact Gyn.     H/H stable at 9.3/30.9. All other labs are benign.     5:24 PM patient declining OCPs. Larisa Rueda MD with OB/GYN recommends provera 20mg TID x 7 days and follow up with Dr. Palomares within 1 week. Discharged to home in stable condition, return to ED warnings given, follow up and patient care instructions given.    I have discussed the treatment and management of this patient with my supervisory physician, and we agree on the plan of care.                 Attending  Attestation:     Physician Attestation Statement for NP/PA:   I discussed this assessment and plan of this patient with the NP/PA, but I did not personally examine the patient. The face to face encounter was performed by the NP/PA.                     Clinical Impression:   The primary encounter diagnosis was Menorrhagia with regular cycle. A diagnosis of Shortness of breath was also pertinent to this visit.    Disposition:   Disposition: Discharged  Condition: Stable                        Billie Cummings PA-C  04/21/18 8996

## 2018-04-21 NOTE — DISCHARGE INSTRUCTIONS
Please take provera 20mg three times daily for seven days. Follow up with Dr. Palomares THIS week. Encourage tylenol and limited ibuprofen for pain relief.     Our goal in the emergency department is to always give you outstanding care and exceptional service. You may receive a survey by mail or e-mail in the next week regarding your experience in our ED. We would greatly appreciate your completing and returning the survey. Your feedback provides us with a way to recognize our staff who give very good care and it helps us learn how to improve when your experience was below our aspiration of excellence.

## 2018-04-21 NOTE — ED TRIAGE NOTES
Patient states vaginal bleeding onset yesterday evening, states 2-3 pads per hour. Tampon in place. Has had blood transfusion in past due ot known ovarian cyst.

## 2018-04-23 ENCOUNTER — TELEPHONE (OUTPATIENT)
Dept: OBSTETRICS AND GYNECOLOGY | Facility: CLINIC | Age: 43
End: 2018-04-23

## 2018-04-23 NOTE — TELEPHONE ENCOUNTER
Dr Palomares pt calling went to the ED this weekend for vaginal bleed and was told to follow up with her doctor wants to be seen.Pt # [phone]

## 2018-04-25 ENCOUNTER — OFFICE VISIT (OUTPATIENT)
Dept: OBSTETRICS AND GYNECOLOGY | Facility: CLINIC | Age: 43
End: 2018-04-25
Attending: OBSTETRICS & GYNECOLOGY
Payer: COMMERCIAL

## 2018-04-25 VITALS
BODY MASS INDEX: 25.78 KG/M2 | SYSTOLIC BLOOD PRESSURE: 140 MMHG | WEIGHT: 145.5 LBS | DIASTOLIC BLOOD PRESSURE: 82 MMHG | HEIGHT: 63 IN

## 2018-04-25 DIAGNOSIS — N92.1 MENORRHAGIA WITH IRREGULAR CYCLE: ICD-10-CM

## 2018-04-25 DIAGNOSIS — D25.9 UTERINE LEIOMYOMA, UNSPECIFIED LOCATION: Primary | ICD-10-CM

## 2018-04-25 PROCEDURE — 3077F SYST BP >= 140 MM HG: CPT | Mod: CPTII,S$GLB,, | Performed by: OBSTETRICS & GYNECOLOGY

## 2018-04-25 PROCEDURE — 99999 PR PBB SHADOW E&M-EST. PATIENT-LVL III: CPT | Mod: PBBFAC,,, | Performed by: OBSTETRICS & GYNECOLOGY

## 2018-04-25 PROCEDURE — 3079F DIAST BP 80-89 MM HG: CPT | Mod: CPTII,S$GLB,, | Performed by: OBSTETRICS & GYNECOLOGY

## 2018-04-25 PROCEDURE — 99214 OFFICE O/P EST MOD 30 MIN: CPT | Mod: S$GLB,,, | Performed by: OBSTETRICS & GYNECOLOGY

## 2018-04-25 RX ORDER — MEDROXYPROGESTERONE ACETATE 10 MG/1
10 TABLET ORAL 2 TIMES DAILY
Qty: 50 TABLET | Refills: 0 | Status: ON HOLD | OUTPATIENT
Start: 2018-04-25 | End: 2018-06-08 | Stop reason: HOSPADM

## 2018-04-26 ENCOUNTER — HOSPITAL ENCOUNTER (OUTPATIENT)
Dept: RADIOLOGY | Facility: OTHER | Age: 43
Discharge: HOME | End: 2018-04-26
Attending: OBSTETRICS & GYNECOLOGY
Payer: COMMERCIAL

## 2018-04-26 DIAGNOSIS — N92.1 MENORRHAGIA WITH IRREGULAR CYCLE: ICD-10-CM

## 2018-04-26 DIAGNOSIS — D25.9 UTERINE LEIOMYOMA, UNSPECIFIED LOCATION: ICD-10-CM

## 2018-04-26 PROCEDURE — 76856 US EXAM PELVIC COMPLETE: CPT | Mod: 26,,, | Performed by: RADIOLOGY

## 2018-04-26 PROCEDURE — 76830 TRANSVAGINAL US NON-OB: CPT | Mod: 26,,, | Performed by: RADIOLOGY

## 2018-04-26 PROCEDURE — 76830 TRANSVAGINAL US NON-OB: CPT | Mod: TC

## 2018-04-26 NOTE — PROGRESS NOTES
Subjective:       Patient ID: George Lew is a 42 y.o. female.    Chief Complaint:  Vaginal Bleeding (C/o went to ER on sat. for vaginal bleeding & SOB, was given provera x 7 days. They did EKG. She is still bleeding & sob)      History of Present Illness  42 y.o. female with medical history of anemia, Sjogern's, fibroids, anxiety presents to the ED this weekend with vaginal bleeding.  Patient reports going through one tampon and 2-3 pads every hour since 3 PM yesterday.  Patient with hx  menorrhagia x 4 years and ultimately recommend a hysterectomy but patient declines. Patient has had negative work up for malignancy  Last LMP was 1 month ago and only lasted a few hours but this cycle was much heavier assoc with weakness and SOB with exertion.      GYN & OB History  No LMP recorded (lmp unknown).   Date of Last Pap: 2017    OB History    Para Term  AB Living   4 2 2   2 2   SAB TAB Ectopic Multiple Live Births           2      # Outcome Date GA Lbr Juanpablo/2nd Weight Sex Delivery Anes PTL Lv   4 AB            3 AB            2 Term         JOYCELYN   1 Term      CS-LTranv   JOYCELYN          Review of Systems  Review of Systems     Objective:     Vitals:    18 1550   BP: (!) 140/82       Physical Exam:   Constitutional: She appears well-developed and well-nourished.             Abdominal: Soft. There is no tenderness.                  Skin: Skin is warm.    Psychiatric: She has a normal mood and affect. Her behavior is normal.   Pelvic : Ext Normal  Vagina:small amount of bleeding noted   Uterus 12-14 week size NT with no palp adnexal masses       Assessment/ Plan:     Orders Placed This Encounter    US Pelvis Comp with Transvag NON-OB (xpd       George was seen today for vaginal bleeding.    Diagnoses and all orders for this visit:    Uterine leiomyoma, unspecified location  -     US Pelvis Comp with Transvag NON-OB (xpd; Future    Menorrhagia with irregular cycle  -     US Pelvis Comp  with Transvag NON-OB (xpd; Future    SHe also has  elevated CA-125 and persistent ovarian cyst.  As is well documented in her medical record, the patient has a long-standing h/o menorrhagia, dysmenorrhea, and an enlarged uterus.  For the past few years she has been encouraged to undergo hysterectomy. On u/s, she had an U/S that revealed a 4 cm right ovarian cyst and 11 cm uterus.  CA-125 was elevated at 124.  Subsequent CT confirmed the U/S findings, and did not show ascites or anything else concerning.  She continues to have monthly heavy and  painful cycles.    Denies FH breast or gyn cancer.  Only abdominal surgery is a C/S.    Long discussion today as pt has not been ready for surgery up to this point. Pt realizes that she is anemic and remote from menopause and that for her health, she needs to proceed with a hyst that we have been rec for years.  Pt would like Dr Leonard to perform her surgery in light of ovarian cysts and persistently elevated Ca125.   I will call his office tomorrow to get the ball rolling with sched surgery. In the meantime, pt taking provera taper as she refuses ocp.   Was placed on 60 mg Provera daily in ER.   Now will decrease to 40 mg daily until bleeding stops and then will take 20mg daily until surgery. Pt will not take OCP despite my best efforts for fear of cancer.    Time spent with pt 40 min in face to face counseling re fibroids  and surgery      Health Maintenance       Date Due Completion Date    Lipid Panel 1973 ---    TETANUS VACCINE 11/01/1991 ---    Mammogram 11/01/2013 ---    Influenza Vaccine 08/01/2017 ---    Pap Smear with HPV Cotest 05/16/2020 5/16/2017

## 2018-04-27 ENCOUNTER — TELEPHONE (OUTPATIENT)
Dept: OBSTETRICS AND GYNECOLOGY | Facility: CLINIC | Age: 43
End: 2018-04-27

## 2018-04-27 NOTE — PROGRESS NOTES
Please call pt  Good news is that her u/s does not look so bad-- uterus is still enlarged and ovarian cyst are the same    Has Dr Leonard office called her yet?  Take her iron pills twice  A day until surgery

## 2018-04-27 NOTE — TELEPHONE ENCOUNTER
"Patient states she has not yet heard from Dr. Leonard office, but she did state someone was supposed to call her. She is eager to her from them because she  wants to know how long it will take her to heal from this sx.   She also wants to know if she needs to see a primary care to check her out and give her clearance to have this sx. She seems very concerned that she may not be healthy enough for it. She mentioned that she is still feeling like her BP is high and that "something is wrong with her heart."   She also would like to know if Noni got her [facility] paperwork. States she ideally would like to just not go back to work and stay out until her sx and recovery and then return. She says she does not want to have taken off because she does not feel well, return to work, and then be out again for an extended amount of time. "I would rather just stay out and just go back when the sx is complete." I notified pt that this may not be possible, but I will speak to Dr. Palomares and Noni to discuss.   "

## 2018-04-27 NOTE — TELEPHONE ENCOUNTER
Yes she can stay out til surgery    Yes I do want her to see her PCP next week    If she does not hear from Dr Leonard office by Monday then let us know and we will contact them again

## 2018-04-27 NOTE — TELEPHONE ENCOUNTER
----- Message from Davy Palomares MD sent at 4/26/2018  9:33 PM CDT -----  Please call pt  Good news is that her u/s does not look so bad-- uterus is still enlarged and ovarian cyst are the same     Has Dr Leonard office called her yet?  Take her iron pills twice  A day until surgery

## 2018-04-27 NOTE — TELEPHONE ENCOUNTER
Patient verbalized understanding. Will return call Monday If she has not heard from Dr. Leonard office. She states she will schedule a visit with a PCP for next week.

## 2018-04-30 ENCOUNTER — TELEPHONE (OUTPATIENT)
Dept: OBSTETRICS AND GYNECOLOGY | Facility: CLINIC | Age: 43
End: 2018-04-30

## 2018-04-30 NOTE — TELEPHONE ENCOUNTER
"Patient states that she never received a call from Dr. Leonard' office. Patient states after this sx she has no where here to recover appropriately, she does not think she will be able to drive back to [place] after her sx. States she is fine with doing the sx here, "but I do not have any place to recover comfortably like, a hotel." She is also a little upset that no one has contacted her from Dr. Leonard' office, and this is stressful to her because she knows she has to be out for 30 days after the sx and she is out now and waiting for this appt is causing her "to have heart palpitations, extreme anxiety, and just generally does not feel well." States she knows these feeling are due to stress and anxiety. She is wondering if she should go back to [place] and start the process or if she should to stay. She said she is past her 3 day [facility] paperwork being filed, and she says today is her personal deadline because she wants to tell her work something today. She has not yet told them she needs to have sx, so they have no idea what is going on with her.    "

## 2018-04-30 NOTE — TELEPHONE ENCOUNTER
I got a message from him today that they are calling her today   I think he must have been out of office last week

## 2018-04-30 NOTE — TELEPHONE ENCOUNTER
Pt is calling to speak with you. Wouldn't tell me what it's regarding. You spoke to her on Friday.Pt # [phone]

## 2018-05-04 ENCOUNTER — TELEPHONE (OUTPATIENT)
Dept: GYNECOLOGIC ONCOLOGY | Facility: CLINIC | Age: 43
End: 2018-05-04

## 2018-05-04 ENCOUNTER — TELEPHONE (OUTPATIENT)
Dept: OBSTETRICS AND GYNECOLOGY | Facility: CLINIC | Age: 43
End: 2018-05-04

## 2018-05-04 ENCOUNTER — OFFICE VISIT (OUTPATIENT)
Dept: GYNECOLOGIC ONCOLOGY | Facility: CLINIC | Age: 43
End: 2018-05-04
Payer: COMMERCIAL

## 2018-05-04 VITALS
HEIGHT: 63 IN | WEIGHT: 143.5 LBS | DIASTOLIC BLOOD PRESSURE: 82 MMHG | HEART RATE: 91 BPM | BODY MASS INDEX: 25.43 KG/M2 | SYSTOLIC BLOOD PRESSURE: 152 MMHG

## 2018-05-04 DIAGNOSIS — R19.00 PELVIC MASS IN FEMALE: Primary | ICD-10-CM

## 2018-05-04 DIAGNOSIS — R97.1 ELEVATED CA-125: ICD-10-CM

## 2018-05-04 PROCEDURE — 99214 OFFICE O/P EST MOD 30 MIN: CPT | Mod: S$GLB,,, | Performed by: OBSTETRICS & GYNECOLOGY

## 2018-05-04 PROCEDURE — 99999 PR PBB SHADOW E&M-EST. PATIENT-LVL III: CPT | Mod: PBBFAC,,, | Performed by: OBSTETRICS & GYNECOLOGY

## 2018-05-04 PROCEDURE — 3079F DIAST BP 80-89 MM HG: CPT | Mod: CPTII,S$GLB,, | Performed by: OBSTETRICS & GYNECOLOGY

## 2018-05-04 PROCEDURE — 3008F BODY MASS INDEX DOCD: CPT | Mod: CPTII,S$GLB,, | Performed by: OBSTETRICS & GYNECOLOGY

## 2018-05-04 PROCEDURE — 3077F SYST BP >= 140 MM HG: CPT | Mod: CPTII,S$GLB,, | Performed by: OBSTETRICS & GYNECOLOGY

## 2018-05-04 NOTE — Clinical Note
Anoop Bhatti.  My first available time is on June 7th.  She wants sooner.  Can you operate on her before then?

## 2018-05-04 NOTE — TELEPHONE ENCOUNTER
Dr Leonard office notes from Today's visit with pt, states that he will contact Dr Palomares about dates sooner than June 7th, which is not acceptable with pt's schedule. No Call or message from him yet. Unless he messages Dr Palomares personally.

## 2018-05-04 NOTE — TELEPHONE ENCOUNTER
Bone pt calling, she would like to have a phone call her 1st thing in the Monday morning regarding if we got  a Referral or call? Not sure) from her other Doctor Dr Leonard  Pt # [phone]

## 2018-05-04 NOTE — PROGRESS NOTES
Subjective:      Patient ID: George Lew is a 42 y.o. female.    Chief Complaint: Pelvic Mass      HPI  Here today for f/u and to schedule surgery.  Had a syncopal episode last week due to pain.  Has not been back to work since. Wants surgery prior to returning to work.  Review of Systems   Constitutional: Negative for activity change, appetite change, chills, fatigue and fever.   HENT: Negative for hearing loss, mouth sores, nosebleeds, sore throat and tinnitus.    Eyes: Negative for visual disturbance.   Respiratory: Negative for cough, chest tightness, shortness of breath and wheezing.    Cardiovascular: Negative for chest pain and leg swelling.   Gastrointestinal: Negative for abdominal distention, abdominal pain, blood in stool, constipation, diarrhea, nausea and vomiting.   Genitourinary: Positive for menstrual problem and pelvic pain. Negative for dysuria, flank pain, frequency, hematuria, vaginal bleeding, vaginal discharge and vaginal pain.   Musculoskeletal: Negative for arthralgias and back pain.   Skin: Negative for rash.   Neurological: Negative for dizziness, seizures, syncope, weakness and numbness.   Hematological: Does not bruise/bleed easily.   Psychiatric/Behavioral: Negative for confusion and sleep disturbance. The patient is not nervous/anxious.        Objective:   Physical Exam:   Constitutional: She is oriented to person, place, and time. She appears well-developed and well-nourished. No distress.    HENT:   Head: Normocephalic and atraumatic.    Eyes: No scleral icterus.    Neck: Normal range of motion. Neck supple.    Cardiovascular: Normal rate and intact distal pulses.  Exam reveals no cyanosis and no edema.     Pulmonary/Chest: Effort normal. No respiratory distress. She exhibits no tenderness.        Abdominal: Soft. Normal appearance. She exhibits no distension, no fluid wave, no ascites and no mass. There is no tenderness. There is no rigidity, no rebound and no guarding. No  hernia.         Genitourinary: Rectum normal and vagina normal. Pelvic exam was performed with patient supine. There is no rash, tenderness or lesion on the right labia. There is no rash, tenderness or lesion on the left labia. Uterus is enlarged and hosting fibroids. Uterus is not deviated, not fixed, not tender and not experiencing uterine prolapse. Cervix is normal. Right adnexum displays no mass, no tenderness and no fullness. Left adnexum displays no mass, no tenderness and no fullness. No bleeding in the vagina. No vaginal discharge found. Labial bartholins normal.Cervix exhibits no motion tenderness, no discharge and no friability.        Uterus Size: 11 cm   Musculoskeletal: Normal range of motion and moves all extremeties. She exhibits no edema.      Lymphadenopathy:     She has no cervical adenopathy.        Right: No inguinal adenopathy present.        Left: No inguinal adenopathy present.    Neurological: She is alert and oriented to person, place, and time.    Skin: Skin is warm. No rash noted. No cyanosis or erythema.    Psychiatric: She has a normal mood and affect. Thought content normal.       Assessment:     1. Pelvic mass in female    2. Elevated CA-125        Plan:       I am agreeable with surgery, but first robot availability for me is June 7th, which is not acceptable for her schedule.  Will touch base with Bone and see if she has sooner availability.  Will call her once I have an answer.

## 2018-05-07 NOTE — TELEPHONE ENCOUNTER
Bone pt- went to see Dr. Leonard and was told to ask Dr. Palomares if she could possibly put her own her schedule, which Dr Leonard has told her he would be there, to see if she can have the sx sooner than June 9th. The pt states she cannot be out of work until June 9th. She is pretty upset that she had to pay a copay for them to just give her the date June 9th, she said that could have been done over the phone. She is very frustrated and stressed.     She would also like to know what is going on with her [facility] paperwork.   She says she cannot stress enough how much she has to get back to work and she wants to know something today. She needs to be able to tell her job what is happening today, so she is begging for assistance.

## 2018-05-07 NOTE — TELEPHONE ENCOUNTER
Dr. Palomares-- pt requesting to speak to Noni or Aakash. States that it is urgent. Pt's # [phone]

## 2018-05-08 NOTE — TELEPHONE ENCOUNTER
The pt and I have spoken   Taking progesterone 2x day  Cycles to occur next next week  Stressed to pt that she DOES need surgery  Pt will sched with Aisha on the June 7th  Not bleeding at this time  Pt understands that neither one of us has anything sooner as it takes 4-6 weeks to get surgeries sched due to OR time  Pt at peace with that and will stay out of work until surgery   Rec to take iron to build HCT counts up before surgery

## 2018-05-09 ENCOUNTER — TELEPHONE (OUTPATIENT)
Dept: INTERNAL MEDICINE | Facility: CLINIC | Age: 43
End: 2018-05-09

## 2018-05-09 ENCOUNTER — OFFICE VISIT (OUTPATIENT)
Dept: INTERNAL MEDICINE | Facility: CLINIC | Age: 43
End: 2018-05-09
Payer: COMMERCIAL

## 2018-05-09 ENCOUNTER — HOSPITAL ENCOUNTER (OUTPATIENT)
Dept: RADIOLOGY | Facility: HOSPITAL | Age: 43
Discharge: HOME | End: 2018-05-09
Attending: FAMILY MEDICINE
Payer: COMMERCIAL

## 2018-05-09 VITALS
HEART RATE: 111 BPM | HEIGHT: 63 IN | OXYGEN SATURATION: 99 % | SYSTOLIC BLOOD PRESSURE: 130 MMHG | DIASTOLIC BLOOD PRESSURE: 73 MMHG | BODY MASS INDEX: 25.75 KG/M2 | WEIGHT: 145.31 LBS | TEMPERATURE: 98 F

## 2018-05-09 DIAGNOSIS — Z01.811 PRE-OP CHEST EXAM: ICD-10-CM

## 2018-05-09 DIAGNOSIS — D64.9 ANEMIA, UNSPECIFIED TYPE: ICD-10-CM

## 2018-05-09 DIAGNOSIS — Z00.00 ROUTINE GENERAL MEDICAL EXAMINATION AT A HEALTH CARE FACILITY: Primary | ICD-10-CM

## 2018-05-09 DIAGNOSIS — R06.09 DOE (DYSPNEA ON EXERTION): ICD-10-CM

## 2018-05-09 PROCEDURE — 99396 PREV VISIT EST AGE 40-64: CPT | Mod: S$GLB,,, | Performed by: FAMILY MEDICINE

## 2018-05-09 PROCEDURE — 3075F SYST BP GE 130 - 139MM HG: CPT | Mod: CPTII,S$GLB,, | Performed by: FAMILY MEDICINE

## 2018-05-09 PROCEDURE — 3078F DIAST BP <80 MM HG: CPT | Mod: CPTII,S$GLB,, | Performed by: FAMILY MEDICINE

## 2018-05-09 PROCEDURE — 71046 X-RAY EXAM CHEST 2 VIEWS: CPT | Mod: TC,PO

## 2018-05-09 PROCEDURE — 3008F BODY MASS INDEX DOCD: CPT | Mod: CPTII,S$GLB,, | Performed by: FAMILY MEDICINE

## 2018-05-09 PROCEDURE — 93005 ELECTROCARDIOGRAM TRACING: CPT | Mod: S$GLB,,, | Performed by: FAMILY MEDICINE

## 2018-05-09 PROCEDURE — 99999 PR PBB SHADOW E&M-EST. PATIENT-LVL III: CPT | Mod: PBBFAC,,, | Performed by: FAMILY MEDICINE

## 2018-05-09 PROCEDURE — 71046 X-RAY EXAM CHEST 2 VIEWS: CPT | Mod: 26,,, | Performed by: RADIOLOGY

## 2018-05-09 PROCEDURE — 93010 ELECTROCARDIOGRAM REPORT: CPT | Mod: S$GLB,,, | Performed by: INTERNAL MEDICINE

## 2018-05-09 NOTE — TELEPHONE ENCOUNTER
----- Message from Bill Cruz MD sent at 5/9/2018  4:00 PM CDT -----  CXR is normal. Thank you. Zeus

## 2018-05-10 ENCOUNTER — TELEPHONE (OUTPATIENT)
Dept: INTERNAL MEDICINE | Facility: CLINIC | Age: 43
End: 2018-05-10

## 2018-05-10 ENCOUNTER — TELEPHONE (OUTPATIENT)
Dept: GYNECOLOGIC ONCOLOGY | Facility: CLINIC | Age: 43
End: 2018-05-10

## 2018-05-10 ENCOUNTER — TELEPHONE (OUTPATIENT)
Dept: OBSTETRICS AND GYNECOLOGY | Facility: CLINIC | Age: 43
End: 2018-05-10

## 2018-05-10 DIAGNOSIS — R19.00 PELVIC MASS: Primary | ICD-10-CM

## 2018-05-10 NOTE — TELEPHONE ENCOUNTER
----- Message from Bill Cruz MD sent at 5/10/2018 12:58 PM CDT -----  When reviewed by cardiology with last months' ekg, no change; ie, no concerns now. Thx

## 2018-05-10 NOTE — TELEPHONE ENCOUNTER
Spoke w/ pt. States she needs an open date 4-28 & end date 8-4 filled out on her paperwork we faxed. Also they need #9 & #10 answered & re-faxed.   Also she needs a note emailed to her at [email or web address] today stating she is under our care until further notice.   I will take care of these things However she spoke w/  office & they said his schedule is booked for June 7th. She is very stressed out & doesn't know whats happening. She knows  & him having been communicating about her surgery. She is not going back to work until this is done.    Pt.aware  is out of the office today.

## 2018-05-11 NOTE — PROGRESS NOTES
"Subjective:   Patient ID: George Lew is a 42 y.o. female.    Chief Complaint:       HPI  41 yo female here for a "pre-op" but has no set surgery date. Warned her about possibility of needing to do some studies again if we do them now; ie, may not be within typical 30-day window of surgery. She insists we proceed. Pt answers phone at end of clinical exam. I gave her over five minutes while typing and doing other things. She remained on phone. I told her I was done and was leaving the room and the nurse would explain about the EKG and labs and she did not seem upset about this.     She earlier states she occ has some membreno without chest discomfort.    Patient queried and denies any further complaints.      ALLERGIES AND MEDICATIONS: updated and reviewed.  Review of patient's allergies indicates:   Allergen Reactions    Pcn [penicillins] Hives and Swelling       Current Outpatient Prescriptions:     ferrous sulfate 325 mg (65 mg iron) Tab tablet, Take 325 mg by mouth 3 (three) times daily. , Disp: , Rfl:     ibuprofen (ADVIL,MOTRIN) 800 MG tablet, Take 1 tablet (800 mg total) by mouth every 8 (eight) hours as needed for Pain., Disp: 30 tablet, Rfl: 2    medroxyPROGESTERone (PROVERA) 10 MG tablet, Take 1 tablet (10 mg total) by mouth 2 (two) times daily., Disp: 50 tablet, Rfl: 0    MULTIVIT-MINERALS/FERROUS FUM (MULTI VITAMIN ORAL), Take by mouth., Disp: , Rfl:     Review of Systems   Constitutional: Positive for fatigue. Negative for activity change, appetite change, chills, diaphoresis, fever and unexpected weight change.   HENT: Negative for congestion, ear discharge, ear pain, facial swelling, hearing loss, nosebleeds, postnasal drip, rhinorrhea, sinus pressure, sneezing, sore throat, tinnitus, trouble swallowing and voice change.    Eyes: Negative for photophobia, pain, discharge, redness, itching and visual disturbance.   Respiratory: Negative for cough, chest tightness, shortness of breath and wheezing. " "   Cardiovascular: Negative for chest pain, palpitations and leg swelling.   Gastrointestinal: Negative for abdominal distention, abdominal pain, anal bleeding, blood in stool, constipation, diarrhea, nausea, rectal pain and vomiting.   Endocrine: Negative for cold intolerance, heat intolerance, polydipsia, polyphagia and polyuria.   Genitourinary: Positive for menstrual problem and pelvic pain. Negative for difficulty urinating, dysuria and flank pain.   Musculoskeletal: Negative for arthralgias, back pain, joint swelling, myalgias and neck pain.   Skin: Negative for rash.   Neurological: Negative for dizziness, tremors, seizures, syncope, speech difficulty, weakness, light-headedness, numbness and headaches.   Psychiatric/Behavioral: Negative for behavioral problems, confusion, decreased concentration, dysphoric mood, sleep disturbance and suicidal ideas. The patient is not nervous/anxious and is not hyperactive.        Objective:     Vitals:    05/09/18 1403   BP: 130/73   Pulse: (!) 111   Temp: 98.3 °F (36.8 °C)   TempSrc: Oral   SpO2: 99%   Weight: 65.9 kg (145 lb 4.5 oz)   Height: 5' 3" (1.6 m)   PainSc: 0-No pain     Body mass index is 25.74 kg/m².    Physical Exam   Constitutional: She is oriented to person, place, and time. She appears well-developed and well-nourished. She is cooperative. She does not have a sickly appearance. No distress.   HENT:   Head: Normocephalic and atraumatic.   Right Ear: Hearing, tympanic membrane, external ear and ear canal normal. No tenderness.   Left Ear: Hearing, tympanic membrane, external ear and ear canal normal. No tenderness.   Nose: Nose normal.   Mouth/Throat: Oropharynx is clear and moist.   Eyes: Conjunctivae and lids are normal. Pupils are equal, round, and reactive to light. Right eye exhibits no discharge. Left eye exhibits no discharge. Right conjunctiva is not injected. Left conjunctiva is not injected. No scleral icterus. Right eye exhibits normal extraocular " motion. Left eye exhibits normal extraocular motion.   Neck: Normal range of motion. Neck supple. No JVD present. Carotid bruit is not present. No tracheal deviation and no edema present. No thyromegaly present.   Cardiovascular: Normal rate, regular rhythm, normal heart sounds and normal pulses.  Exam reveals no friction rub.    No murmur heard.  Pulmonary/Chest: Effort normal and breath sounds normal. No accessory muscle usage. No respiratory distress. She has no wheezes. She has no rhonchi. She has no rales.   Abdominal: Soft. Bowel sounds are normal. She exhibits no distension, no abdominal bruit, no pulsatile midline mass and no mass. There is no hepatosplenomegaly. There is no tenderness. There is no rebound, no guarding, no CVA tenderness, no tenderness at McBurney's point and negative Rodriguez's sign.   Musculoskeletal: She exhibits no edema.   Lymphadenopathy:        Head (right side): No submandibular, no preauricular and no posterior auricular adenopathy present.        Head (left side): No submandibular, no preauricular and no posterior auricular adenopathy present.     She has no cervical adenopathy.   Neurological: She is alert and oriented to person, place, and time. GCS eye subscore is 4. GCS verbal subscore is 5. GCS motor subscore is 6.   Skin: Skin is warm and dry. No ecchymosis and no rash noted. Rash is not maculopapular and not urticarial. She is not diaphoretic. No cyanosis or erythema. Nails show no clubbing.   Psychiatric: She has a normal mood and affect. Her speech is normal and behavior is normal. Thought content normal. Her mood appears not anxious. Her affect is not angry and not inappropriate. She does not exhibit a depressed mood.       Assessment and Plan:   George was seen today for annual exam and establish care.    Diagnoses and all orders for this visit:    Routine general medical examination at a health care facility  -     CBC auto differential; Future  -     Comprehensive  metabolic panel; Future  -     Hemoglobin A1c; Future  -     Lipid panel; Future  -     TSH; Future  -     T4, free; Future    Anemia, unspecified type  -     Iron; Future  -     Folate; Future  -     Ferritin; Future  -     Vitamin B12; Future    CROCKETT (dyspnea on exertion)  -     2D Echo w/ Color Flow Doppler; Future  -     SCHEDULED EKG 12-LEAD (to Muse); Future  -     SCHEDULED EKG 12-LEAD (to Muse)    Pre-op chest exam  -     X-Ray Chest PA And Lateral; Future    Warned her about possibility of needing to do some studies again if we do them now; ie, may not be within typical 30-day window of surgery. She insists we proceed. Pt answers phone at end of clinical exam. I gave her over five minutes while typing and doing other things. She remained on phone. I told her I was done and was leaving the room and the nurse would explain about the EKG and labs and she did not seem upset about this.   No Follow-up on file.    THIS NOTE WILL BE SHARED WITH THE PATIENT.

## 2018-05-22 ENCOUNTER — TELEPHONE (OUTPATIENT)
Dept: GYNECOLOGIC ONCOLOGY | Facility: CLINIC | Age: 43
End: 2018-05-22

## 2018-05-22 NOTE — TELEPHONE ENCOUNTER
Spoke with the patient about her questions related to surgery.  I again explained we would leave her uninvolved ovary and remove the cystic ovary, both tubes, and uterus.  She voiced understanding.

## 2018-05-31 ENCOUNTER — HOSPITAL ENCOUNTER (OUTPATIENT)
Dept: PREADMISSION TESTING | Facility: OTHER | Age: 43
Discharge: HOME | End: 2018-05-31
Attending: OBSTETRICS & GYNECOLOGY
Payer: COMMERCIAL

## 2018-05-31 ENCOUNTER — HOSPITAL ENCOUNTER (OUTPATIENT)
Dept: CARDIOLOGY | Facility: CLINIC | Age: 43
Discharge: HOME | End: 2018-05-31
Payer: COMMERCIAL

## 2018-05-31 ENCOUNTER — ANESTHESIA EVENT (OUTPATIENT)
Dept: SURGERY | Facility: OTHER | Age: 43
End: 2018-05-31
Payer: COMMERCIAL

## 2018-05-31 VITALS
BODY MASS INDEX: 25.69 KG/M2 | DIASTOLIC BLOOD PRESSURE: 83 MMHG | HEIGHT: 63 IN | SYSTOLIC BLOOD PRESSURE: 142 MMHG | OXYGEN SATURATION: 99 % | HEART RATE: 89 BPM | TEMPERATURE: 99 F | WEIGHT: 145 LBS

## 2018-05-31 DIAGNOSIS — R06.00 DYSPNEA: ICD-10-CM

## 2018-05-31 DIAGNOSIS — R06.09 DOE (DYSPNEA ON EXERTION): ICD-10-CM

## 2018-05-31 LAB
ANION GAP SERPL CALC-SCNC: 10 MMOL/L
ANISOCYTOSIS BLD QL SMEAR: SLIGHT
BASOPHILS # BLD AUTO: 0.05 K/UL
BASOPHILS NFR BLD: 0.8 %
BUN SERPL-MCNC: 8 MG/DL
CALCIUM SERPL-MCNC: 9.3 MG/DL
CHLORIDE SERPL-SCNC: 105 MMOL/L
CO2 SERPL-SCNC: 24 MMOL/L
CREAT SERPL-MCNC: 0.7 MG/DL
DIASTOLIC DYSFUNCTION: NO
DIFFERENTIAL METHOD: ABNORMAL
EOSINOPHIL # BLD AUTO: 0.2 K/UL
EOSINOPHIL NFR BLD: 2.6 %
ERYTHROCYTE [DISTWIDTH] IN BLOOD BY AUTOMATED COUNT: 18.6 %
EST. GFR  (AFRICAN AMERICAN): >60 ML/MIN/1.73 M^2
EST. GFR  (NON AFRICAN AMERICAN): >60 ML/MIN/1.73 M^2
ESTIMATED PA SYSTOLIC PRESSURE: 28.6
GIANT PLATELETS BLD QL SMEAR: PRESENT
GLUCOSE SERPL-MCNC: 85 MG/DL
HCT VFR BLD AUTO: 27.5 %
HGB BLD-MCNC: 8.3 G/DL
HYPOCHROMIA BLD QL SMEAR: ABNORMAL
LYMPHOCYTES # BLD AUTO: 1.9 K/UL
LYMPHOCYTES NFR BLD: 30.1 %
MCH RBC QN AUTO: 21.3 PG
MCHC RBC AUTO-ENTMCNC: 30.2 G/DL
MCV RBC AUTO: 71 FL
MITRAL VALVE MOBILITY: NORMAL
MONOCYTES # BLD AUTO: 0.5 K/UL
MONOCYTES NFR BLD: 8.3 %
NEUTROPHILS # BLD AUTO: 3.6 K/UL
NEUTROPHILS NFR BLD: 58.2 %
OVALOCYTES BLD QL SMEAR: ABNORMAL
PLATELET # BLD AUTO: 744 K/UL
PLATELET BLD QL SMEAR: ABNORMAL
PMV BLD AUTO: 9.5 FL
POIKILOCYTOSIS BLD QL SMEAR: SLIGHT
POLYCHROMASIA BLD QL SMEAR: ABNORMAL
POTASSIUM SERPL-SCNC: 4.2 MMOL/L
RBC # BLD AUTO: 3.9 M/UL
RETIRED EF AND QEF - SEE NOTES: 65 (ref 55–65)
SCHISTOCYTES BLD QL SMEAR: PRESENT
SODIUM SERPL-SCNC: 139 MMOL/L
TRICUSPID VALVE REGURGITATION: NORMAL
WBC # BLD AUTO: 6.25 K/UL

## 2018-05-31 PROCEDURE — 85025 COMPLETE CBC W/AUTO DIFF WBC: CPT

## 2018-05-31 PROCEDURE — 80048 BASIC METABOLIC PNL TOTAL CA: CPT

## 2018-05-31 PROCEDURE — 36415 COLL VENOUS BLD VENIPUNCTURE: CPT

## 2018-05-31 RX ORDER — LIDOCAINE HYDROCHLORIDE 10 MG/ML
0.5 INJECTION, SOLUTION EPIDURAL; INFILTRATION; INTRACAUDAL; PERINEURAL ONCE
Status: CANCELLED | OUTPATIENT
Start: 2018-05-31 | End: 2018-05-31

## 2018-05-31 RX ORDER — MIDAZOLAM HYDROCHLORIDE 5 MG/ML
5 INJECTION INTRAMUSCULAR; INTRAVENOUS
Status: ACTIVE | OUTPATIENT
Start: 2018-05-31 | End: 2018-05-31

## 2018-05-31 RX ORDER — SODIUM CHLORIDE, SODIUM LACTATE, POTASSIUM CHLORIDE, CALCIUM CHLORIDE 600; 310; 30; 20 MG/100ML; MG/100ML; MG/100ML; MG/100ML
INJECTION, SOLUTION INTRAVENOUS CONTINUOUS
Status: CANCELLED | OUTPATIENT
Start: 2018-05-31

## 2018-05-31 NOTE — DISCHARGE INSTRUCTIONS
PRE-ADMIT TESTING -  [phone]    [address]  [place] [facility]          Your surgery has been scheduled at [facility]. We are pleased to have the opportunity to serve you. For Further Information please call [phone].    On the day of surgery please report to the Information Desk on the 1st floor.    · CONTACT YOUR PHYSICIAN'S OFFICE THE DAY PRIOR TO YOUR SURGERY TO OBTAIN YOUR ARRIVAL TIME.     · The evening before surgery do not eat anything after 9 p.m. ( this includes hard candy, chewing gum and mints).  You may only have GATORADE, POWERADE AND WATER  from 9 p.m. until you leave your home.   DO NOT DRINK ANY LIQUIDS ON THE WAY TO THE HOSPITAL.      SPECIAL MEDICATION INSTRUCTIONS: TAKE medications checked off by the Anesthesiologist on your Medication List.    Angiogram Patients: Take medications as instructed by your physician, including aspirin.     Surgery Patients:    If you take ASPIRIN - Your PHYSICIAN/SURGEON will need to inform you IF/OR when you need to stop taking aspirin prior to your surgery.     Do Not take any medications containing IBUPROFEN.  Do Not Wear any make-up or dark nail polish   (especially eye make-up) to surgery. If you come to surgery with makeup on you will be required to remove the makeup or nail polish.    Do not shave your surgical area at least 5 days prior to your surgery. The surgical prep will be performed at the hospital according to Infection Control regulations.    Leave all valuables at home.   Do Not wear any jewelry or watches, including any metal in body piercings.  Contact Lens must be removed before surgery. Either do not wear the contact lens or bring a case and solution for storage.  Please bring a container for eyeglasses or dentures as required.  Bring any paperwork your physician has provided, such as consent forms,  history and physicals, doctor's orders, etc.   Bring comfortable clothes that are loose fitting to wear upon  discharge. Take into consideration the type of surgery being performed.  Maintain your diet as advised per your physician the day prior to surgery.      Adequate rest the night before surgery is advised.   Park in the Parking lot behind the hospital or in the [facility] across the street from the parking lot. Parking is complimentary.  If you will be discharged the same day as your procedure, please arrange for a responsible adult to drive you home or to accompany you if traveling by taxi.   YOU WILL NOT BE PERMITTED TO DRIVE OR TO LEAVE THE HOSPITAL ALONE AFTER SURGERY.   It is strongly recommended that you arrange for someone to remain with you for the first 24 hrs following your surgery.       Thank you for your cooperation.  The Staff of [facility].        Bathing Instructions                                                                 Please shower the evening before and morning of your procedure with    ANTIBACTERIAL SOAP. ( DIAL, etc )  Concentrate on the surgical area   for at least 3 minutes and rinse completely. Dry off as usual.   Do not use any deodorant, powder, body lotions, perfume, after shave or    cologne.

## 2018-05-31 NOTE — ANESTHESIA PREPROCEDURE EVALUATION
05/31/2018  George Lew is a 42 y.o., female.    Anesthesia Evaluation    I have reviewed the Patient Summary Reports.    I have reviewed the Nursing Notes.   I have reviewed the Medications.     Review of Systems  Anesthesia Hx:  Denies Family Hx of Anesthesia complications.   Denies Personal Hx of Anesthesia complications.   Social:  Non-Smoker    Hematology/Oncology:         -- Anemia: Hematology Comments: H/H 9.3/30.9.    Pt describes frequent nosebleeds pt understands to be associated with sjorgren's syndrome. Will check with surgeon regarding workup.    Cardiovascular:   Hypertension Dysrhythmias (pt describes palpitations associated with anxiety and HTN. Unclear workup.  Normal sinus rhythm on EKG)  Primary Care MD recommends Echo to evaluate dyspnea.  Test not completed as of pre-op visit.  Will attempt to coordinate prior to surgery.   Pulmonary:   Shortness of breath Patient c/o increasing frequency of SOB. She believes it to be related to Sjogren's syndrome.  Has not seen rheumatologist in last year.   Renal/:  Renal/ Normal     Hepatic/GI:  Hepatic/GI Normal    Neurological:   Headaches    Endocrine:  Endocrine Normal    Psych:   anxiety depression          Physical Exam  General:  Well nourished    Airway/Jaw/Neck:  Airway Findings: Tongue: Normal General Airway Assessment: Adult  Mallampati: II  TM Distance: Normal, at least 6 cm      Dental:  Dental Findings: In tact, molar caps, upper front caps        Mental Status:  Mental Status Findings:  Alert and Oriented, Cooperative         Anesthesia Plan  Type of Anesthesia, risks & benefits discussed:  Anesthesia Type:  general  Patient's Preference:   Intra-op Monitoring Plan: standard ASA monitors  Intra-op Monitoring Plan Comments:   Post Op Pain Control Plan: multimodal analgesia  Post Op Pain Control Plan Comments:   Induction:    IV  Beta Blocker:         Informed Consent: Patient understands risks and agrees with Anesthesia plan.  Questions answered. Anesthesia consent signed with patient.  ASA Score: 3     Day of Surgery Review of History & Physical:    H&P update referred to the surgeon.     Anesthesia Plan Notes: Will repeat CBC, Chem. T&S, Pt refuses blood transfusion.  OK with albumin.  Pt very anxious about surgery.ECHO wnl        Ready For Surgery From Anesthesia Perspective.

## 2018-06-01 ENCOUNTER — TELEPHONE (OUTPATIENT)
Dept: INTERNAL MEDICINE | Facility: CLINIC | Age: 43
End: 2018-06-01

## 2018-06-06 ENCOUNTER — TELEPHONE (OUTPATIENT)
Dept: GYNECOLOGIC ONCOLOGY | Facility: CLINIC | Age: 43
End: 2018-06-06

## 2018-06-07 ENCOUNTER — HOSPITAL ENCOUNTER (OUTPATIENT)
Facility: OTHER | Age: 43
Discharge: HOME | End: 2018-06-08
Attending: OBSTETRICS & GYNECOLOGY | Admitting: OBSTETRICS & GYNECOLOGY
Payer: COMMERCIAL

## 2018-06-07 ENCOUNTER — SURGERY (OUTPATIENT)
Age: 43
End: 2018-06-07

## 2018-06-07 ENCOUNTER — ANESTHESIA (OUTPATIENT)
Dept: SURGERY | Facility: OTHER | Age: 43
End: 2018-06-07
Payer: COMMERCIAL

## 2018-06-07 DIAGNOSIS — R97.1 ELEVATED CA-125: ICD-10-CM

## 2018-06-07 DIAGNOSIS — R19.00 PELVIC MASS IN FEMALE: Primary | ICD-10-CM

## 2018-06-07 DIAGNOSIS — Z98.890 S/P ROBOT-ASSISTED SURGICAL PROCEDURE: ICD-10-CM

## 2018-06-07 LAB
ABO + RH BLD: NORMAL
B-HCG UR QL: NEGATIVE
BLD GP AB SCN CELLS X3 SERPL QL: NORMAL
CTP QC/QA: YES

## 2018-06-07 PROCEDURE — 25000003 PHARM REV CODE 250: Performed by: OBSTETRICS & GYNECOLOGY

## 2018-06-07 PROCEDURE — 36415 COLL VENOUS BLD VENIPUNCTURE: CPT

## 2018-06-07 PROCEDURE — 25000003 PHARM REV CODE 250: Performed by: NURSE ANESTHETIST, CERTIFIED REGISTERED

## 2018-06-07 PROCEDURE — 58573 TLH W/T/O UTERUS OVER 250 G: CPT | Mod: ,,, | Performed by: OBSTETRICS & GYNECOLOGY

## 2018-06-07 PROCEDURE — 63600175 PHARM REV CODE 636 W HCPCS: Performed by: STUDENT IN AN ORGANIZED HEALTH CARE EDUCATION/TRAINING PROGRAM

## 2018-06-07 PROCEDURE — 63600175 PHARM REV CODE 636 W HCPCS: Performed by: ANESTHESIOLOGY

## 2018-06-07 PROCEDURE — 37000008 HC ANESTHESIA 1ST 15 MINUTES: Performed by: OBSTETRICS & GYNECOLOGY

## 2018-06-07 PROCEDURE — 36000713 HC OR TIME LEV V EA ADD 15 MIN: Performed by: OBSTETRICS & GYNECOLOGY

## 2018-06-07 PROCEDURE — 63600175 PHARM REV CODE 636 W HCPCS: Performed by: NURSE ANESTHETIST, CERTIFIED REGISTERED

## 2018-06-07 PROCEDURE — 58573 TLH W/T/O UTERUS OVER 250 G: CPT | Mod: AS,,, | Performed by: NURSE PRACTITIONER

## 2018-06-07 PROCEDURE — 94799 UNLISTED PULMONARY SVC/PX: CPT

## 2018-06-07 PROCEDURE — 94761 N-INVAS EAR/PLS OXIMETRY MLT: CPT

## 2018-06-07 PROCEDURE — 63600175 PHARM REV CODE 636 W HCPCS: Performed by: OBSTETRICS & GYNECOLOGY

## 2018-06-07 PROCEDURE — 25000003 PHARM REV CODE 250: Performed by: ANESTHESIOLOGY

## 2018-06-07 PROCEDURE — 82962 GLUCOSE BLOOD TEST: CPT | Performed by: OBSTETRICS & GYNECOLOGY

## 2018-06-07 PROCEDURE — 86901 BLOOD TYPING SEROLOGIC RH(D): CPT

## 2018-06-07 PROCEDURE — 71000033 HC RECOVERY, INTIAL HOUR: Performed by: OBSTETRICS & GYNECOLOGY

## 2018-06-07 PROCEDURE — 81025 URINE PREGNANCY TEST: CPT | Performed by: ANESTHESIOLOGY

## 2018-06-07 PROCEDURE — 27000221 HC OXYGEN, UP TO 24 HOURS

## 2018-06-07 PROCEDURE — 88307 TISSUE EXAM BY PATHOLOGIST: CPT | Mod: 26,,, | Performed by: PATHOLOGY

## 2018-06-07 PROCEDURE — 71000039 HC RECOVERY, EACH ADD'L HOUR: Performed by: OBSTETRICS & GYNECOLOGY

## 2018-06-07 PROCEDURE — 88307 TISSUE EXAM BY PATHOLOGIST: CPT | Performed by: PATHOLOGY

## 2018-06-07 PROCEDURE — 37000009 HC ANESTHESIA EA ADD 15 MINS: Performed by: OBSTETRICS & GYNECOLOGY

## 2018-06-07 PROCEDURE — 27201423 OPTIME MED/SURG SUP & DEVICES STERILE SUPPLY: Performed by: OBSTETRICS & GYNECOLOGY

## 2018-06-07 PROCEDURE — 36000712 HC OR TIME LEV V 1ST 15 MIN: Performed by: OBSTETRICS & GYNECOLOGY

## 2018-06-07 RX ORDER — HYDRALAZINE HYDROCHLORIDE 20 MG/ML
10 INJECTION INTRAMUSCULAR; INTRAVENOUS EVERY 6 HOURS PRN
Status: DISCONTINUED | OUTPATIENT
Start: 2018-06-07 | End: 2018-06-07

## 2018-06-07 RX ORDER — DEXTROSE MONOHYDRATE, SODIUM CHLORIDE, AND POTASSIUM CHLORIDE 50; 1.49; 4.5 G/1000ML; G/1000ML; G/1000ML
INJECTION, SOLUTION INTRAVENOUS CONTINUOUS
Status: DISCONTINUED | OUTPATIENT
Start: 2018-06-07 | End: 2018-06-08

## 2018-06-07 RX ORDER — FENTANYL CITRATE 50 UG/ML
INJECTION, SOLUTION INTRAMUSCULAR; INTRAVENOUS
Status: DISCONTINUED | OUTPATIENT
Start: 2018-06-07 | End: 2018-06-07

## 2018-06-07 RX ORDER — MUPIROCIN 20 MG/G
OINTMENT TOPICAL
Status: DISCONTINUED | OUTPATIENT
Start: 2018-06-07 | End: 2018-06-07

## 2018-06-07 RX ORDER — ONDANSETRON 8 MG/1
8 TABLET, ORALLY DISINTEGRATING ORAL EVERY 8 HOURS PRN
Status: DISCONTINUED | OUTPATIENT
Start: 2018-06-07 | End: 2018-06-08 | Stop reason: HOSPADM

## 2018-06-07 RX ORDER — ONDANSETRON HYDROCHLORIDE 2 MG/ML
INJECTION, SOLUTION INTRAMUSCULAR; INTRAVENOUS
Status: DISCONTINUED | OUTPATIENT
Start: 2018-06-07 | End: 2018-06-07

## 2018-06-07 RX ORDER — LIDOCAINE HYDROCHLORIDE 10 MG/ML
0.5 INJECTION, SOLUTION EPIDURAL; INFILTRATION; INTRACAUDAL; PERINEURAL ONCE
Status: DISCONTINUED | OUTPATIENT
Start: 2018-06-07 | End: 2018-06-07

## 2018-06-07 RX ORDER — LIDOCAINE HYDROCHLORIDE 10 MG/ML
1 INJECTION, SOLUTION EPIDURAL; INFILTRATION; INTRACAUDAL; PERINEURAL ONCE
Status: DISCONTINUED | OUTPATIENT
Start: 2018-06-07 | End: 2018-06-07

## 2018-06-07 RX ORDER — HYDROCODONE BITARTRATE AND ACETAMINOPHEN 5; 325 MG/1; MG/1
1 TABLET ORAL EVERY 4 HOURS PRN
Status: DISCONTINUED | OUTPATIENT
Start: 2018-06-07 | End: 2018-06-08 | Stop reason: HOSPADM

## 2018-06-07 RX ORDER — ACETAMINOPHEN 10 MG/ML
INJECTION, SOLUTION INTRAVENOUS
Status: DISCONTINUED | OUTPATIENT
Start: 2018-06-07 | End: 2018-06-07

## 2018-06-07 RX ORDER — FENTANYL CITRATE 50 UG/ML
25 INJECTION, SOLUTION INTRAMUSCULAR; INTRAVENOUS EVERY 5 MIN PRN
Status: COMPLETED | OUTPATIENT
Start: 2018-06-07 | End: 2018-06-07

## 2018-06-07 RX ORDER — CEFAZOLIN SODIUM 1 G/3ML
2 INJECTION, POWDER, FOR SOLUTION INTRAMUSCULAR; INTRAVENOUS
Status: COMPLETED | OUTPATIENT
Start: 2018-06-07 | End: 2018-06-07

## 2018-06-07 RX ORDER — SODIUM CHLORIDE, SODIUM LACTATE, POTASSIUM CHLORIDE, CALCIUM CHLORIDE 600; 310; 30; 20 MG/100ML; MG/100ML; MG/100ML; MG/100ML
INJECTION, SOLUTION INTRAVENOUS CONTINUOUS
Status: DISCONTINUED | OUTPATIENT
Start: 2018-06-07 | End: 2018-06-07

## 2018-06-07 RX ORDER — AMOXICILLIN 250 MG
1 CAPSULE ORAL 2 TIMES DAILY
Status: DISCONTINUED | OUTPATIENT
Start: 2018-06-07 | End: 2018-06-08 | Stop reason: HOSPADM

## 2018-06-07 RX ORDER — HYDROCODONE BITARTRATE AND ACETAMINOPHEN 10; 325 MG/1; MG/1
1 TABLET ORAL EVERY 4 HOURS PRN
Status: DISCONTINUED | OUTPATIENT
Start: 2018-06-07 | End: 2018-06-08 | Stop reason: HOSPADM

## 2018-06-07 RX ORDER — OXYCODONE HYDROCHLORIDE 5 MG/1
5 TABLET ORAL
Status: DISCONTINUED | OUTPATIENT
Start: 2018-06-07 | End: 2018-06-07

## 2018-06-07 RX ORDER — SODIUM CHLORIDE 0.9 % (FLUSH) 0.9 %
3 SYRINGE (ML) INJECTION
Status: DISCONTINUED | OUTPATIENT
Start: 2018-06-07 | End: 2018-06-08 | Stop reason: HOSPADM

## 2018-06-07 RX ORDER — MIDAZOLAM HYDROCHLORIDE 1 MG/ML
INJECTION INTRAMUSCULAR; INTRAVENOUS
Status: DISCONTINUED | OUTPATIENT
Start: 2018-06-07 | End: 2018-06-07

## 2018-06-07 RX ORDER — IBUPROFEN 600 MG/1
600 TABLET ORAL EVERY 6 HOURS
Status: DISCONTINUED | OUTPATIENT
Start: 2018-06-07 | End: 2018-06-08 | Stop reason: HOSPADM

## 2018-06-07 RX ORDER — MUPIROCIN 20 MG/G
1 OINTMENT TOPICAL 2 TIMES DAILY
Status: DISCONTINUED | OUTPATIENT
Start: 2018-06-07 | End: 2018-06-08 | Stop reason: HOSPADM

## 2018-06-07 RX ORDER — CALCIUM CARBONATE 200(500)MG
500 TABLET,CHEWABLE ORAL DAILY PRN
Status: DISCONTINUED | OUTPATIENT
Start: 2018-06-07 | End: 2018-06-08 | Stop reason: HOSPADM

## 2018-06-07 RX ORDER — NEOSTIGMINE METHYLSULFATE 1 MG/ML
INJECTION, SOLUTION INTRAVENOUS
Status: DISCONTINUED | OUTPATIENT
Start: 2018-06-07 | End: 2018-06-07

## 2018-06-07 RX ORDER — HYDROMORPHONE HYDROCHLORIDE 2 MG/ML
0.4 INJECTION, SOLUTION INTRAMUSCULAR; INTRAVENOUS; SUBCUTANEOUS EVERY 5 MIN PRN
Status: DISCONTINUED | OUTPATIENT
Start: 2018-06-07 | End: 2018-06-07

## 2018-06-07 RX ORDER — HYDROMORPHONE HYDROCHLORIDE 2 MG/ML
0.5 INJECTION, SOLUTION INTRAMUSCULAR; INTRAVENOUS; SUBCUTANEOUS
Status: DISCONTINUED | OUTPATIENT
Start: 2018-06-07 | End: 2018-06-08 | Stop reason: HOSPADM

## 2018-06-07 RX ORDER — PROPOFOL 10 MG/ML
VIAL (ML) INTRAVENOUS
Status: DISCONTINUED | OUTPATIENT
Start: 2018-06-07 | End: 2018-06-07

## 2018-06-07 RX ORDER — GLYCOPYRROLATE 0.2 MG/ML
INJECTION INTRAMUSCULAR; INTRAVENOUS
Status: DISCONTINUED | OUTPATIENT
Start: 2018-06-07 | End: 2018-06-07

## 2018-06-07 RX ORDER — DIPHENHYDRAMINE HCL 25 MG
25 CAPSULE ORAL EVERY 6 HOURS PRN
Status: DISCONTINUED | OUTPATIENT
Start: 2018-06-07 | End: 2018-06-08 | Stop reason: HOSPADM

## 2018-06-07 RX ORDER — DEXAMETHASONE SODIUM PHOSPHATE 4 MG/ML
INJECTION, SOLUTION INTRA-ARTICULAR; INTRALESIONAL; INTRAMUSCULAR; INTRAVENOUS; SOFT TISSUE
Status: DISCONTINUED | OUTPATIENT
Start: 2018-06-07 | End: 2018-06-07

## 2018-06-07 RX ORDER — FERROUS SULFATE 325(65) MG
325 TABLET, DELAYED RELEASE (ENTERIC COATED) ORAL DAILY
Status: DISCONTINUED | OUTPATIENT
Start: 2018-06-08 | End: 2018-06-08 | Stop reason: HOSPADM

## 2018-06-07 RX ORDER — LIDOCAINE HCL/PF 100 MG/5ML
SYRINGE (ML) INTRAVENOUS
Status: DISCONTINUED | OUTPATIENT
Start: 2018-06-07 | End: 2018-06-07

## 2018-06-07 RX ORDER — DIPHENHYDRAMINE HYDROCHLORIDE 50 MG/ML
25 INJECTION INTRAMUSCULAR; INTRAVENOUS EVERY 6 HOURS PRN
Status: DISCONTINUED | OUTPATIENT
Start: 2018-06-07 | End: 2018-06-07

## 2018-06-07 RX ORDER — ONDANSETRON 2 MG/ML
4 INJECTION INTRAMUSCULAR; INTRAVENOUS DAILY PRN
Status: DISCONTINUED | OUTPATIENT
Start: 2018-06-07 | End: 2018-06-07

## 2018-06-07 RX ORDER — HYDRALAZINE HYDROCHLORIDE 20 MG/ML
10 INJECTION INTRAMUSCULAR; INTRAVENOUS EVERY 4 HOURS PRN
Status: DISCONTINUED | OUTPATIENT
Start: 2018-06-07 | End: 2018-06-08 | Stop reason: HOSPADM

## 2018-06-07 RX ORDER — KETOROLAC TROMETHAMINE 30 MG/ML
INJECTION, SOLUTION INTRAMUSCULAR; INTRAVENOUS
Status: DISCONTINUED | OUTPATIENT
Start: 2018-06-07 | End: 2018-06-07

## 2018-06-07 RX ORDER — SIMETHICONE 80 MG
1 TABLET,CHEWABLE ORAL 3 TIMES DAILY PRN
Status: DISCONTINUED | OUTPATIENT
Start: 2018-06-07 | End: 2018-06-08 | Stop reason: HOSPADM

## 2018-06-07 RX ORDER — MEPERIDINE HYDROCHLORIDE 50 MG/ML
12.5 INJECTION INTRAMUSCULAR; INTRAVENOUS; SUBCUTANEOUS ONCE AS NEEDED
Status: DISCONTINUED | OUTPATIENT
Start: 2018-06-07 | End: 2018-06-07

## 2018-06-07 RX ORDER — ROCURONIUM BROMIDE 10 MG/ML
INJECTION, SOLUTION INTRAVENOUS
Status: DISCONTINUED | OUTPATIENT
Start: 2018-06-07 | End: 2018-06-07

## 2018-06-07 RX ADMIN — FENTANYL CITRATE 25 MCG: 50 INJECTION, SOLUTION INTRAMUSCULAR; INTRAVENOUS at 04:06

## 2018-06-07 RX ADMIN — DEXTROSE MONOHYDRATE, SODIUM CHLORIDE, AND POTASSIUM CHLORIDE: 50; 4.5; 1.49 INJECTION, SOLUTION INTRAVENOUS at 05:06

## 2018-06-07 RX ADMIN — FENTANYL CITRATE 25 MCG: 50 INJECTION, SOLUTION INTRAMUSCULAR; INTRAVENOUS at 03:06

## 2018-06-07 RX ADMIN — LIDOCAINE HYDROCHLORIDE 100 MG: 20 INJECTION, SOLUTION INTRAVENOUS at 01:06

## 2018-06-07 RX ADMIN — HYDRALAZINE HYDROCHLORIDE 10 MG: 20 INJECTION INTRAMUSCULAR; INTRAVENOUS at 08:06

## 2018-06-07 RX ADMIN — CARBOXYMETHYLCELLULOSE SODIUM 2 DROP: 2.5 SOLUTION/ DROPS OPHTHALMIC at 01:06

## 2018-06-07 RX ADMIN — OXYCODONE HYDROCHLORIDE 5 MG: 5 TABLET ORAL at 03:06

## 2018-06-07 RX ADMIN — MUPIROCIN: 20 OINTMENT TOPICAL at 11:06

## 2018-06-07 RX ADMIN — MIDAZOLAM HYDROCHLORIDE 2 MG: 1 INJECTION, SOLUTION INTRAMUSCULAR; INTRAVENOUS at 01:06

## 2018-06-07 RX ADMIN — PROPOFOL 50 MG: 10 INJECTION, EMULSION INTRAVENOUS at 02:06

## 2018-06-07 RX ADMIN — GLYCOPYRROLATE 0.6 MG: 0.2 INJECTION, SOLUTION INTRAMUSCULAR; INTRAVENOUS at 02:06

## 2018-06-07 RX ADMIN — FENTANYL CITRATE 50 MCG: 50 INJECTION, SOLUTION INTRAMUSCULAR; INTRAVENOUS at 02:06

## 2018-06-07 RX ADMIN — STANDARDIZED SENNA CONCENTRATE AND DOCUSATE SODIUM 1 TABLET: 8.6; 5 TABLET, FILM COATED ORAL at 08:06

## 2018-06-07 RX ADMIN — PROPOFOL 200 MG: 10 INJECTION, EMULSION INTRAVENOUS at 01:06

## 2018-06-07 RX ADMIN — DEXAMETHASONE SODIUM PHOSPHATE 8 MG: 4 INJECTION, SOLUTION INTRAMUSCULAR; INTRAVENOUS at 01:06

## 2018-06-07 RX ADMIN — ONDANSETRON 4 MG: 2 INJECTION, SOLUTION INTRAMUSCULAR; INTRAVENOUS at 01:06

## 2018-06-07 RX ADMIN — SODIUM CHLORIDE, SODIUM LACTATE, POTASSIUM CHLORIDE, AND CALCIUM CHLORIDE: 600; 310; 30; 20 INJECTION, SOLUTION INTRAVENOUS at 12:06

## 2018-06-07 RX ADMIN — ONDANSETRON 8 MG: 8 TABLET, ORALLY DISINTEGRATING ORAL at 08:06

## 2018-06-07 RX ADMIN — MUPIROCIN 1 G: 20 OINTMENT TOPICAL at 08:06

## 2018-06-07 RX ADMIN — FENTANYL CITRATE 100 MCG: 50 INJECTION, SOLUTION INTRAMUSCULAR; INTRAVENOUS at 01:06

## 2018-06-07 RX ADMIN — ROCURONIUM BROMIDE 50 MG: 10 INJECTION, SOLUTION INTRAVENOUS at 01:06

## 2018-06-07 RX ADMIN — SODIUM CHLORIDE, SODIUM LACTATE, POTASSIUM CHLORIDE, AND CALCIUM CHLORIDE: 600; 310; 30; 20 INJECTION, SOLUTION INTRAVENOUS at 02:06

## 2018-06-07 RX ADMIN — ACETAMINOPHEN 1000 MG: 10 INJECTION, SOLUTION INTRAVENOUS at 01:06

## 2018-06-07 RX ADMIN — FENTANYL CITRATE 50 MCG: 50 INJECTION, SOLUTION INTRAMUSCULAR; INTRAVENOUS at 01:06

## 2018-06-07 RX ADMIN — KETOROLAC TROMETHAMINE 30 MG: 30 INJECTION, SOLUTION INTRAMUSCULAR; INTRAVENOUS at 02:06

## 2018-06-07 RX ADMIN — CEFAZOLIN 2 G: 330 INJECTION, POWDER, FOR SOLUTION INTRAMUSCULAR; INTRAVENOUS at 01:06

## 2018-06-07 RX ADMIN — NEOSTIGMINE METHYLSULFATE 5 MG: 1 INJECTION INTRAVENOUS at 02:06

## 2018-06-07 NOTE — TELEPHONE ENCOUNTER
Spoke with the patient at length today about her concerns regarding transfusion.  She has no Confucianist beliefs that would prevent her from accepting products, nor has she had a previous reaction to the blood transfusion she had in the past.  He simply ayden like to not have blood if at all possible.  I reassured her that a transfusion would be used only in the case of life or death circumstances, an that we would try volume expanders initially.  She was satisfied with this and agreed to sign the transfusion consent in preop.

## 2018-06-07 NOTE — OPERATIVE NOTE ADDENDUM
Certification of Assistant at Surgery       Surgery Date: 6/7/2018     Participating Surgeons:  Surgeon(s) and Role:     * Eliseo Leonard MD - Primary     * BETTY Macdonald MD - Resident - Assisting       Amita Christie NP-C, First Assist    Procedures:  Procedure(s) (LRB):  XI ROBOTIC ASSISTED LAPAROSCOPIC HYSTERECTOMY (N/A)  ROBOTIC SALPINGO-OOPHORECTOMY (Right)  XI ROBOTIC SALPINGECTOMY (Left)    Assistant Surgeon's Certification of Necessity:  I understand that section 1842 (b) (6) (d) of the Social Security Act generally prohibits Medicare Part B reasonable charge payment for the services of assistants at surgery in teaching hospitals when qualified residents are available to furnish such services. I certify that the services for which payment is claimed were medically necessary, and that no qualified resident was available to perform the services. I further understand that these services are subject to post-payment review by the Medicare carrier.      Amita Christie NP    06/07/2018  2:50 PM

## 2018-06-07 NOTE — OP NOTE
DATE OF PROCEDURE:  06/07/2018.    SURGEON:  Eliseo Leonard M.D.    ASSISTANT:  BETTY Macdonald M.D. (RES) and Amita Christie, nurse practitioner   who served as first assist.    PREOPERATIVE DIAGNOSES:  1.  Elevated CA-125.  2.  A 4 cm ovarian cyst.  3.  Suspected adenomyosis with menorrhagia and dysmenorrhea.    POSTOPERATIVE DIAGNOSIS:  Probable adenomyosis and benign appearing right ovary.    PROCEDURES:  Robotic-assisted laparoscopic hysterectomy, left salpingectomy,   right salpingo-oophorectomy.    COMPLICATIONS:  None.    ESTIMATED BLOOD LOSS:  Less than 50 mL.    ANESTHESIA:  General.    INTRAOPERATIVE FINDINGS:  Included a 14 cm uterus with 4 cm right ovarian cyst.    The patient's left ovary was normal.  The patient had no other intraabdominal   or pelvic abnormalities.    PROCEDURE IN DETAIL:  After informed consent was obtained, the patient was taken   to the Operating Suite.  General anesthesia was administered.  Once this was   felt to be adequate, she was placed in dorsal lithotomy position with her arms   tucked.  The abdomen and pelvis were prepped and draped in the usual sterile   fashion and a speculum was placed in the vagina.  The cervix was visualized,   grasped with single-tooth tenaculum and uterus sounded to approximately 10 cm.    Serial dilation of cervix was performed and a large VCare manipulator was placed   without difficulty.  Calixto catheter was placed to gravity drainage and   attention was turned to the abdominal portion of the procedure.  On preoperative   imaging, the patient was noted to have a fat-containing umbilical hernia.    Therefore, we made an incision above this and placed a Veress needle through.    Pneumoperitoneum was obtained after confirming the pressure was 2 and the   opening pressure was 2 and once the pneumoperitoneum was adequate, the Veress   was removed and an 8 mm robotic trocar was placed through this.  Intraperitoneal   placement confirmed the camera and  lateral robotic trocars x3 were placed   through 8 mm incisions.  A right upper quadrant 8 mm accessory port was placed.    The patient was placed in steep Trendelenburg.  The robot was docked and   instruments were passed in the operative field.  Attention was first turned to   the left side where the left retroperitoneum was entered with monopolar cautery.    The left round ligament was transected after sacrificed with bipolar cautery.    The left pararectal space was developed.  The ureter was identified and a   window was made beneath the uteroovarian ligament.  The ovary on this side   appeared normal and I elected to save it.  The tube on this side was elevated   and the mesosalpinx was sacrificed with bipolar cautery and transected.  Once   the tube had been adequately mobilized off of the ovary, the uteroovarian   ligament was sacrificed with bipolar cautery and transected.  Medial fold of the   peritoneum was then taken down the uterosacral ligament on the left.  Attention   was turned to the right side where the right round ligament was transected.    The anterior and posterior leaflets of the broad ligament were opened.  The   pararectal space was developed on this side and the ureter was identified.  A   window was made beneath the infundibulopelvic ligament and this was sacrificed   with bipolar cautery and transected.  The peritoneum on this side was also taken   down to the uterosacral.  Anteriorly, the vesicouterine peritoneum was incised   and the bladder was mobilized inferiorly over the ring.  A 10 o'clock to 2   o'clock colpotomy was performed.  Posteriorly, a 4 o'clock to 8 o'clock   colpotomy was performed.  Bilateral cardinal ligaments and uterine vessels   skeletonized, their peritoneal attachments sacrificed with bipolar cautery and   transected.  Circumferential colpotomy was completed and the uterus, cervix and   the right tube and ovary and left tube were removed.  Vaginal cuff was made    hemostatic with cautery and was closed with a running nonlocking 0 Vicryl suture   tied in the midline.  All pedicles were inspected.  The pelvis was irrigated.    Once hemostasis was confirmed, Zechariah was applied due to some raw dissection   beds and the instruments were removed, the robot was undocked and   pneumoperitoneum was evacuated.  The patient was flattened.  All ports were   removed.  Port sites were inspected and made hemostatic with electrocautery and   closed with subcuticular 4-0 Monocryl suture.  Steri-Strips were applied, and   the patient was awoken and taken to Recovery Room in stable condition.  Of note,   I was present for and performed all key aspects of procedure.  Amita Christie's expertise was needed as there was no qualified resident available.      RHONA/IN  dd: 06/07/2018 17:07:18 (CDT)  td: 06/07/2018 17:40:41 (CDT)  Doc ID   #[id number]  Job ID #[id number]    CC:

## 2018-06-07 NOTE — H&P
No interval changes have occurred since this clinic visit from 5/4/2018    HPI  Here today for f/u and to schedule surgery.  Had a syncopal episode last week due to pain.  Has not been back to work since. Wants surgery prior to returning to work.  Review of Systems   Constitutional: Negative for activity change, appetite change, chills, fatigue and fever.   HENT: Negative for hearing loss, mouth sores, nosebleeds, sore throat and tinnitus.    Eyes: Negative for visual disturbance.   Respiratory: Negative for cough, chest tightness, shortness of breath and wheezing.    Cardiovascular: Negative for chest pain and leg swelling.   Gastrointestinal: Negative for abdominal distention, abdominal pain, blood in stool, constipation, diarrhea, nausea and vomiting.   Genitourinary: Positive for menstrual problem and pelvic pain. Negative for dysuria, flank pain, frequency, hematuria, vaginal bleeding, vaginal discharge and vaginal pain.   Musculoskeletal: Negative for arthralgias and back pain.   Skin: Negative for rash.   Neurological: Negative for dizziness, seizures, syncope, weakness and numbness.   Hematological: Does not bruise/bleed easily.   Psychiatric/Behavioral: Negative for confusion and sleep disturbance. The patient is not nervous/anxious.          Objective:   Physical Exam:   Constitutional: She is oriented to person, place, and time. She appears well-developed and well-nourished. No distress.    HENT:   Head: Normocephalic and atraumatic.    Eyes: No scleral icterus.    Neck: Normal range of motion. Neck supple.    Cardiovascular: Normal rate and intact distal pulses.  Exam reveals no cyanosis and no edema.     Pulmonary/Chest: Effort normal. No respiratory distress. She exhibits no tenderness.      Abdominal: Soft. Normal appearance. She exhibits no distension, no fluid wave, no ascites and no mass. There is no tenderness. There is no rigidity, no rebound and no guarding. No hernia.         Genitourinary:  Rectum normal and vagina normal. Pelvic exam was performed with patient supine. There is no rash, tenderness or lesion on the right labia. There is no rash, tenderness or lesion on the left labia. Uterus is enlarged and hosting fibroids. Uterus is not deviated, not fixed, not tender and not experiencing uterine prolapse. Cervix is normal. Right adnexum displays no mass, no tenderness and no fullness. Left adnexum displays no mass, no tenderness and no fullness. No bleeding in the vagina. No vaginal discharge found. Labial bartholins normal.Cervix exhibits no motion tenderness, no discharge and no friability.        Uterus Size: 11 cm   Musculoskeletal: Normal range of motion and moves all extremeties. She exhibits no edema.      Lymphadenopathy:     She has no cervical adenopathy.        Right: No inguinal adenopathy present.        Left: No inguinal adenopathy present.    Neurological: She is alert and oriented to person, place, and time.    Skin: Skin is warm. No rash noted. No cyanosis or erythema.    Psychiatric: She has a normal mood and affect. Thought content normal.         Assessment:      1. Pelvic mass in female    2. Elevated CA-125          Plan:       I am agreeable with surgery, but first robot availability for me is June 7th, which is not acceptable for her schedule.  Will touch base with [facility] and see if she has sooner availability.  Will call her once I have an answer.

## 2018-06-07 NOTE — ANESTHESIA POSTPROCEDURE EVALUATION
"Anesthesia Post Evaluation    Patient: George Lew    Procedure(s) Performed: Procedure(s) (LRB):  XI ROBOTIC ASSISTED LAPAROSCOPIC HYSTERECTOMY (N/A)  ROBOTIC SALPINGO-OOPHORECTOMY (Right)  XI ROBOTIC SALPINGECTOMY (Left)    Final Anesthesia Type: general  Patient location during evaluation: PACU  Patient participation: Yes- Able to Participate  Level of consciousness: awake and alert  Post-procedure vital signs: reviewed and stable  Pain management: adequate  Airway patency: patent  PONV status at discharge: No PONV  Anesthetic complications: no      Cardiovascular status: blood pressure returned to baseline  Respiratory status: unassisted and room air  Hydration status: euvolemic  Follow-up not needed.        Visit Vitals  BP (!) 169/93   Pulse 69   Temp 36.9 °C (98.4 °F) (Oral)   Resp 16   Ht 5' 3" (1.6 m)   Wt 65.8 kg (145 lb)   SpO2 100%   BMI 25.69 kg/m²       Pain/Jerod Score: Pain Assessment Performed: Yes (6/7/2018  3:36 PM)  Presence of Pain: complains of pain/discomfort (6/7/2018  3:36 PM)  Pain Rating Prior to Med Admin: 7 (6/7/2018  3:59 PM)  Pain Rating Post Med Admin: 7 (6/7/2018  3:59 PM)  Jerod Score: 8 (6/7/2018  3:36 PM)      "

## 2018-06-07 NOTE — TRANSFER OF CARE
"Anesthesia Transfer of Care Note    Patient: George Lew    Procedure(s) Performed: Procedure(s) (LRB):  XI ROBOTIC ASSISTED LAPAROSCOPIC HYSTERECTOMY (N/A)  ROBOTIC SALPINGO-OOPHORECTOMY (Right)  XI ROBOTIC SALPINGECTOMY (Left)    Patient location: PACU    Anesthesia Type: general    Transport from OR: Transported from OR on 2-3 L/min O2 by NC with adequate spontaneous ventilation    Post pain: adequate analgesia    Post assessment: no apparent anesthetic complications    Post vital signs: stable    Level of consciousness: awake and alert    Nausea/Vomiting: no nausea/vomiting    Complications: none    Transfer of care protocol was followed      Last vitals:   Visit Vitals  BP (!) 161/88   Pulse 74   Temp 36.7 °C (98 °F)   Resp 18   Ht 5' 3" (1.6 m)   Wt 65.8 kg (145 lb)   SpO2 100%   BMI 25.69 kg/m²     "

## 2018-06-08 VITALS
HEART RATE: 104 BPM | HEIGHT: 63 IN | DIASTOLIC BLOOD PRESSURE: 81 MMHG | WEIGHT: 145 LBS | TEMPERATURE: 98 F | OXYGEN SATURATION: 98 % | RESPIRATION RATE: 18 BRPM | BODY MASS INDEX: 25.69 KG/M2 | SYSTOLIC BLOOD PRESSURE: 163 MMHG

## 2018-06-08 LAB
ANISOCYTOSIS BLD QL SMEAR: SLIGHT
BASOPHILS # BLD AUTO: 0.01 K/UL
BASOPHILS NFR BLD: 0.1 %
DIFFERENTIAL METHOD: ABNORMAL
EOSINOPHIL # BLD AUTO: 0 K/UL
EOSINOPHIL NFR BLD: 0 %
ERYTHROCYTE [DISTWIDTH] IN BLOOD BY AUTOMATED COUNT: 18.5 %
GIANT PLATELETS BLD QL SMEAR: PRESENT
HCT VFR BLD AUTO: 26.7 %
HGB BLD-MCNC: 8 G/DL
HYPOCHROMIA BLD QL SMEAR: ABNORMAL
LYMPHOCYTES # BLD AUTO: 1.1 K/UL
LYMPHOCYTES NFR BLD: 8 %
MCH RBC QN AUTO: 20.8 PG
MCHC RBC AUTO-ENTMCNC: 30 G/DL
MCV RBC AUTO: 70 FL
MONOCYTES # BLD AUTO: 0.9 K/UL
MONOCYTES NFR BLD: 6.9 %
NEUTROPHILS # BLD AUTO: 11.2 K/UL
NEUTROPHILS NFR BLD: 85 %
OVALOCYTES BLD QL SMEAR: ABNORMAL
PLATELET # BLD AUTO: 570 K/UL
PLATELET BLD QL SMEAR: ABNORMAL
PMV BLD AUTO: 9.4 FL
POCT GLUCOSE: 91 MG/DL (ref 70–110)
POIKILOCYTOSIS BLD QL SMEAR: SLIGHT
POLYCHROMASIA BLD QL SMEAR: ABNORMAL
RBC # BLD AUTO: 3.84 M/UL
SCHISTOCYTES BLD QL SMEAR: PRESENT
WBC # BLD AUTO: 13.23 K/UL

## 2018-06-08 PROCEDURE — 85025 COMPLETE CBC W/AUTO DIFF WBC: CPT

## 2018-06-08 PROCEDURE — 36415 COLL VENOUS BLD VENIPUNCTURE: CPT

## 2018-06-08 PROCEDURE — 25000003 PHARM REV CODE 250: Performed by: OBSTETRICS & GYNECOLOGY

## 2018-06-08 RX ORDER — HYDROCODONE BITARTRATE AND ACETAMINOPHEN 5; 325 MG/1; MG/1
1 TABLET ORAL EVERY 4 HOURS PRN
Qty: 25 TABLET | Refills: 0 | Status: SHIPPED | OUTPATIENT
Start: 2018-06-08

## 2018-06-08 RX ORDER — IBUPROFEN 600 MG/1
600 TABLET ORAL EVERY 6 HOURS PRN
Qty: 30 TABLET | Refills: 3 | Status: SHIPPED | OUTPATIENT
Start: 2018-06-08

## 2018-06-08 RX ADMIN — IBUPROFEN 600 MG: 600 TABLET ORAL at 11:06

## 2018-06-08 RX ADMIN — DEXTROSE MONOHYDRATE, SODIUM CHLORIDE, AND POTASSIUM CHLORIDE: 50; 4.5; 1.49 INJECTION, SOLUTION INTRAVENOUS at 01:06

## 2018-06-08 RX ADMIN — IBUPROFEN 600 MG: 600 TABLET ORAL at 05:06

## 2018-06-08 RX ADMIN — IBUPROFEN 600 MG: 600 TABLET ORAL at 12:06

## 2018-06-08 RX ADMIN — MUPIROCIN 1 G: 20 OINTMENT TOPICAL at 08:06

## 2018-06-08 RX ADMIN — FERROUS SULFATE TAB EC 325 MG (65 MG FE EQUIVALENT) 325 MG: 325 (65 FE) TABLET DELAYED RESPONSE at 08:06

## 2018-06-08 RX ADMIN — STANDARDIZED SENNA CONCENTRATE AND DOCUSATE SODIUM 1 TABLET: 8.6; 5 TABLET, FILM COATED ORAL at 08:06

## 2018-06-08 NOTE — DISCHARGE SUMMARY
[facility]  Obstetrics & Gynecology  Discharge Summary    Patient Name: George Lew  MRN: [id number]  Admission Date: 2018  Hospital Length of Stay: 0 days  Discharge Date and Time:  2018 5:27 PM  Attending Physician: No att. providers found   Discharging Provider: Kassandra Nunez MD  Primary Care Provider: Tan Morales MD, MD    HPI:  Ms. Lew is a 42 yr old  who presents for a scheduled RATLH secondary to the presence of a pelvic mass.     Hospital Course:   - Scheduled RATLH. Uncomplicated procedure.  2018 - POD # 1 s/p RATLH/RSO/LS. Patient doing well. Meeting all post op milestones. recommend PCP follow up fir HTN.    Procedure(s) (LRB):  XI ROBOTIC ASSISTED LAPAROSCOPIC HYSTERECTOMY (N/A)  ROBOTIC SALPINGO-OOPHORECTOMY (Right)  XI ROBOTIC SALPINGECTOMY (Left)     Significant Diagnostic Studies: Labs:   CBC   Recent Labs  Lab 18  0456   WBC 13.23*   HGB 8.0*   HCT 26.7*   *       Pending Diagnostic Studies:     None        Final Active Diagnoses:    Diagnosis Date Noted POA    S/P robot-assisted surgical procedure (RA-TLH RSO/LS) [Z98.890] 2018 Not Applicable    Pelvic mass in female [R19.00] 2018 Yes    Essential hypertension with goal blood pressure less than 140/90 [I10] 2016 Yes      Problems Resolved During this Admission:    Diagnosis Date Noted Date Resolved POA        Discharged Condition: good    Disposition: Home or Self Care    Follow Up:  Follow-up Information     Eliseo Leonard MD In 4 months.    Specialties:  Gynecologic Oncology, Gynecology, Oncology  Why:  For Post-Op Visit  Contact information:  [address] [facility] [address] [phone]                 Patient Instructions:     Activity as tolerated     Lifting restrictions     Other restrictions (specify):   Order Comments: Pelvic rest until post op visit. (No sex, no tampons, no douching, etc.)     Call MD for:  temperature  >100.4     Call MD for:  persistent nausea and vomiting or diarrhea     Call MD for:  severe uncontrolled pain     Call MD for:  difficulty breathing or increased cough     Call MD for:  severe persistent headache     Call MD for:  persistent dizziness, light-headedness, or visual disturbances     Call MD for:  increased confusion or weakness     Call MD for:   Order Comments: Vaginal bleeding through one or more pads each hour for 2 hours     Call MD for:  redness, tenderness, or signs of infection (pain, swelling, redness, odor or green/yellow discharge around incision site)       Medications:  Reconciled Home Medications:      Medication List      START taking these medications    HYDROcodone-acetaminophen 5-325 mg per tablet  Commonly known as:  NORCO  Take 1 tablet by mouth every 4 (four) hours as needed.        CHANGE how you take these medications    ibuprofen 600 MG tablet  Commonly known as:  ADVIL,MOTRIN  Take 1 tablet (600 mg total) by mouth every 6 (six) hours as needed for Pain.  What changed:  · medication strength  · how much to take  · when to take this        CONTINUE taking these medications    ferrous sulfate 325 mg (65 mg iron) Tab tablet  Take 325 mg by mouth 3 (three) times daily.     MULTI VITAMIN ORAL  Take by mouth.        STOP taking these medications    medroxyPROGESTERone 10 MG tablet  Commonly known as:  PROVECARLOS A Nunez MD  Obstetrics & Gynecology  [facility]

## 2018-06-08 NOTE — SUBJECTIVE & OBJECTIVE
"Interval History: Patient states that she is doing "OK" this morning.  She has not ambulated or voided spontaneously yet as her hill remains in place.  She ate a small amount of dinner last night but has some nausea that was relieved with Zofran. She has not passed flatus.  Her pain is currently well controlled.      Scheduled Meds:   ferrous sulfate  325 mg Oral Daily    ibuprofen  600 mg Oral Q6H    mupirocin  1 g Nasal BID    senna-docusate 8.6-50 mg  1 tablet Oral BID     Continuous Infusions:   dextrose 5 % and 0.45 % NaCl with KCl 20 mEq 125 mL/hr at 06/08/18 0608     PRN Meds:calcium carbonate, diphenhydrAMINE, hydrALAZINE, HYDROcodone-acetaminophen, HYDROcodone-acetaminophen, HYDROmorphone, ondansetron, promethazine (PHENERGAN) IVPB, simethicone, sodium chloride 0.9%    Review of patient's allergies indicates:   Allergen Reactions    Pcn [penicillins] Hives and Swelling       Objective:     Vital Signs (Most Recent):  Temp: 98.7 °F (37.1 °C) (06/08/18 0455)  Pulse: 94 (06/08/18 0455)  Resp: 20 (06/07/18 1945)  BP: (!) 177/89 (06/08/18 0455)  SpO2: 98 % (06/08/18 0455) Vital Signs (24h Range):  Temp:  [98 °F (36.7 °C)-98.7 °F (37.1 °C)] 98.7 °F (37.1 °C)  Pulse:  [] 94  Resp:  [16-20] 20  SpO2:  [98 %-100 %] 98 %  BP: (147-204)/() 177/89     Weight: 65.8 kg (145 lb)  Body mass index is 25.69 kg/m².  No LMP recorded.    I&O (Last 24H):    Intake/Output Summary (Last 24 hours) at 06/08/18 0610  Last data filed at 06/08/18 0608   Gross per 24 hour   Intake          2731.25 ml   Output             3150 ml   Net          -418.75 ml       Physical Exam:   Constitutional: She is oriented to person, place, and time. She appears well-developed and well-nourished. No distress.    HENT:   Head: Normocephalic and atraumatic.    Eyes: Conjunctivae are normal.    Neck: Neck supple.    Cardiovascular: Normal rate, regular rhythm and intact distal pulses.     Pulmonary/Chest: Effort normal. No respiratory " distress.        Abdominal: Soft. Bowel sounds are normal. She exhibits abdominal incision (5 trocar sites C/D/I. Without signs of infection or inflammation. ). She exhibits no distension. There is tenderness (Mild TTP. Appropriate for post-op period. ). There is no rebound and no guarding.     Genitourinary:   Genitourinary Comments: Calixto in place           Musculoskeletal:   SCDs in place.        Neurological: She is alert and oriented to person, place, and time.    Skin: Skin is warm and dry. She is not diaphoretic.    Psychiatric: She has a normal mood and affect. Her behavior is normal. Judgment and thought content normal.       Laboratory:    Post-Op CBC - Pending on AM Rounds

## 2018-06-08 NOTE — ASSESSMENT & PLAN NOTE
- BP: (147-204)/() 177/89  - Required one push of IV Hydralazine overnight  - PRN Hydralazine order placed  - Patient on no hypertensive meds at home

## 2018-06-08 NOTE — NURSING
Patient s/p lap hyster, with 5 puncture sites.  Patient VSS other than hypertension.  Start of shift BP was 204/104 manually, called MD, order given for hydralazine.  BP improved, will continue to monitor.  Patient had nausea, gave zofran with moderate relief.  Patient has been drowsy, but has now more alert.

## 2018-06-08 NOTE — CARE UPDATE
Tolerating blood transfusion well. No adverse reactions noted. VSS. Family @ bedside. Call light in reach.

## 2018-06-08 NOTE — BRIEF OP NOTE
BRIEF OPERATIVE NOTE       SUMMARY      Surgery Date: 06/07/2018     SURGEON: Eliseo Leonard MD    ASSISTANT: Missael Macdonald, PGY-3  Amita Christie NP    PREOP DIAGNOSIS:   1. 4cm ovarian cyst  2. Elevated CA-125  3. Enlarged uterus (11 weeks on U/S)    POSTOP DIAGNOSIS:  Same    PROCEDURES:   1. Robotic-assisted laparoscopic hysterectomy with right salpingo-oopherectomy and left salpingectomy    ANESTHESIA: general2    FINDINGS/KEY COMPONENTS:   1. 14 week sized uterus, enlarged 4cm right ovary, grossly normal fallopian tubes bilaterally, left ovary  2. Grossly normal intraabdominal anatomy  3. No surgical complications, good hemostasis at conclusion of case    ESTIMATED BLOOD LOSS: Minimal    COMPLICATIONS: None    PATHOLOGY:   Specimens     Start     Ordered    06/07/18 1421  Specimen to Pathology - Surgery  Once      06/07/18 1424        Rocio Macdonald MD   PGY-3 Ob-gyn

## 2018-06-08 NOTE — PROGRESS NOTES
"[facility]  Obstetrics & Gynecology  Progress Note    Patient Name: George Lew  MRN: [id number]  Admission Date: 2018  Primary Care Provider: Tan Morales MD, MD  Principal Problem: <principal problem not specified>    Subjective:     HPI:  Ms. Lew is a 42 yr old  who presents for a scheduled RATLH secondary to the presence of a pelvic mass.     Interval History: Patient states that she is doing "OK" this morning.  She has not ambulated or voided spontaneously yet as her hill remains in place.  She ate a small amount of dinner last night but has some nausea that was relieved with Zofran. She has not passed flatus.  Her pain is currently well controlled.      Scheduled Meds:   ferrous sulfate  325 mg Oral Daily    ibuprofen  600 mg Oral Q6H    mupirocin  1 g Nasal BID    senna-docusate 8.6-50 mg  1 tablet Oral BID     Continuous Infusions:   dextrose 5 % and 0.45 % NaCl with KCl 20 mEq 125 mL/hr at 18 0608     PRN Meds:calcium carbonate, diphenhydrAMINE, hydrALAZINE, HYDROcodone-acetaminophen, HYDROcodone-acetaminophen, HYDROmorphone, ondansetron, promethazine (PHENERGAN) IVPB, simethicone, sodium chloride 0.9%    Review of patient's allergies indicates:   Allergen Reactions    Pcn [penicillins] Hives and Swelling       Objective:     Vital Signs (Most Recent):  Temp: 98.7 °F (37.1 °C) (18 0455)  Pulse: 94 (18 045)  Resp: 20 (18 194)  BP: (!) 177/89 (185)  SpO2: 98 % (18 0455) Vital Signs (24h Range):  Temp:  [98 °F (36.7 °C)-98.7 °F (37.1 °C)] 98.7 °F (37.1 °C)  Pulse:  [] 94  Resp:  [16-20] 20  SpO2:  [98 %-100 %] 98 %  BP: (147-204)/() 177/89     Weight: 65.8 kg (145 lb)  Body mass index is 25.69 kg/m².  No LMP recorded.    I&O (Last 24H):    Intake/Output Summary (Last 24 hours) at 18 0610  Last data filed at 18 0608   Gross per 24 hour   Intake          2731.25 ml   Output             3150 ml "   Net          -418.75 ml       Physical Exam:   Constitutional: She is oriented to person, place, and time. She appears well-developed and well-nourished. No distress.    HENT:   Head: Normocephalic and atraumatic.    Eyes: Conjunctivae are normal.    Neck: Neck supple.    Cardiovascular: Normal rate, regular rhythm and intact distal pulses.     Pulmonary/Chest: Effort normal. No respiratory distress.        Abdominal: Soft. Bowel sounds are normal. She exhibits abdominal incision (5 trocar sites C/D/I. Without signs of infection or inflammation. ). She exhibits no distension. There is tenderness (Mild TTP. Appropriate for post-op period. ). There is no rebound and no guarding.     Genitourinary:   Genitourinary Comments: Calixto in place           Musculoskeletal:   SCDs in place.        Neurological: She is alert and oriented to person, place, and time.    Skin: Skin is warm and dry. She is not diaphoretic.    Psychiatric: She has a normal mood and affect. Her behavior is normal. Judgment and thought content normal.       Laboratory:    Post-Op CBC - Pending on AM Rounds      Assessment/Plan:     S/P robot-assisted surgical procedure (-[facility] RSO/LS)    - Standard Post-Operative Advances  - Pain well controlled  - Urine Output Appropriate and tolerating PO Fluids - Will D/C Fluids  - Pull Calixto this AM. Void Trial  - Encourage Ambulation and I.S.   - ADAT > Awaiting Bowel Function. Bowel sounds present  - AM CBC - Pending  - Will follow post-operative advances to evaluate possible discharge          Essential hypertension with goal blood pressure less than 140/90    - BP: (147-204)/() 177/89  - Required one push of IV Hydralazine overnight  - PRN Hydralazine order placed  - Patient on no hypertensive meds at home          Pelvic mass in female    - S/P RATLH/RSO/LS             Dilan Falcon MD  Obstetrics & Gynecology  [facility]      Agree with above. Pt has spoken with her family  medicine doctor about antihypertensive medications in the past. She does not desire long term medications. Discussed the importance of BP control and pt states her understanding. Recommend close follow up with PCP as outpatient.    Kassandra Nunez M.D.  PGY-3 ObGyn  [phone]

## 2018-06-08 NOTE — HOSPITAL COURSE
06/07/02018 - Scheduled RATLH. Uncomplicated procedure.  06/08/2018 - POD # 1 s/p RATLH/RSO/LS. Patient doing well. Starting to meet post-op goals. Will follow post-operative advances this AM.

## 2018-06-08 NOTE — ASSESSMENT & PLAN NOTE
- Standard Post-Operative Advances  - Pain well controlled  - Urine Output Appropriate and tolerating PO Fluids - Will D/C Fluids  - Pull Calixto this AM. Void Trial  - Encourage Ambulation and I.S.   - ADAT > Awaiting Bowel Function. Bowel sounds present  - AM CBC - Pending  - Will follow post-operative advances to evaluate possible discharge

## 2018-06-08 NOTE — HPI
Ms. Lew is a 42 yr old  who presents for a scheduled RATLH secondary to the presence of a pelvic mass.

## 2018-06-08 NOTE — PLAN OF CARE
Problem: Patient Care Overview  Goal: Plan of Care Review  Outcome: Ongoing (interventions implemented as appropriate)  Patient in no apparent distress. Sat's  99 % on 1 lpm. IS done . Will continue to monitor.

## 2018-06-12 ENCOUNTER — TELEPHONE (OUTPATIENT)
Dept: GYNECOLOGIC ONCOLOGY | Facility: CLINIC | Age: 43
End: 2018-06-12

## 2018-06-13 ENCOUNTER — TELEPHONE (OUTPATIENT)
Dept: OBSTETRICS AND GYNECOLOGY | Facility: CLINIC | Age: 43
End: 2018-06-13

## 2018-06-13 NOTE — TELEPHONE ENCOUNTER
Pt called to speak to Noni about changing the dates on her [facility] paperwork. Pt can't get in with  until after 7/9 for her 4 week follow up but the exact date of the appt has not been scheduled yet. Pt said the date needs to be changed to atleast after that.

## 2018-06-14 NOTE — TELEPHONE ENCOUNTER
NARA....Spoke w/ pt., she had her hyst on 6-7 with , she needs a letter on letterhead faxed to the fax number that was on her [facility] papers stating that she cannot return to work on 7-7 as scheduled. She is unable to get a post-op appt. With Aisha until 7-9. I faxed a letter to them stating she cannot return to work until she has her post-op visit on 7-9.   I asked if she is okay physically to wait until 7-9 & she said she is okay.

## 2018-06-21 ENCOUNTER — TELEPHONE (OUTPATIENT)
Dept: OBSTETRICS AND GYNECOLOGY | Facility: CLINIC | Age: 43
End: 2018-06-21

## 2018-06-21 NOTE — TELEPHONE ENCOUNTER
Dr Palomares pt calling, wants to speak with Noni regarding FMLA paper. Please call pt # [phone]

## 2018-06-22 ENCOUNTER — OFFICE VISIT (OUTPATIENT)
Dept: OBSTETRICS AND GYNECOLOGY | Facility: CLINIC | Age: 43
End: 2018-06-22
Attending: STUDENT IN AN ORGANIZED HEALTH CARE EDUCATION/TRAINING PROGRAM
Payer: COMMERCIAL

## 2018-06-22 VITALS
DIASTOLIC BLOOD PRESSURE: 82 MMHG | SYSTOLIC BLOOD PRESSURE: 122 MMHG | HEIGHT: 63 IN | WEIGHT: 143.19 LBS | TEMPERATURE: 99 F | BODY MASS INDEX: 25.37 KG/M2

## 2018-06-22 DIAGNOSIS — N89.8 VAGINAL DISCHARGE: ICD-10-CM

## 2018-06-22 DIAGNOSIS — Z98.890 POSTOPERATIVE STATE: Primary | ICD-10-CM

## 2018-06-22 PROCEDURE — 3008F BODY MASS INDEX DOCD: CPT | Mod: CPTII,S$GLB,, | Performed by: STUDENT IN AN ORGANIZED HEALTH CARE EDUCATION/TRAINING PROGRAM

## 2018-06-22 PROCEDURE — 87086 URINE CULTURE/COLONY COUNT: CPT

## 2018-06-22 PROCEDURE — 3079F DIAST BP 80-89 MM HG: CPT | Mod: CPTII,S$GLB,, | Performed by: STUDENT IN AN ORGANIZED HEALTH CARE EDUCATION/TRAINING PROGRAM

## 2018-06-22 PROCEDURE — 87510 GARDNER VAG DNA DIR PROBE: CPT

## 2018-06-22 PROCEDURE — 87480 CANDIDA DNA DIR PROBE: CPT

## 2018-06-22 PROCEDURE — 99024 POSTOP FOLLOW-UP VISIT: CPT | Mod: S$GLB,,, | Performed by: STUDENT IN AN ORGANIZED HEALTH CARE EDUCATION/TRAINING PROGRAM

## 2018-06-22 PROCEDURE — 99999 PR PBB SHADOW E&M-EST. PATIENT-LVL IV: CPT | Mod: PBBFAC,,, | Performed by: STUDENT IN AN ORGANIZED HEALTH CARE EDUCATION/TRAINING PROGRAM

## 2018-06-22 PROCEDURE — 3074F SYST BP LT 130 MM HG: CPT | Mod: CPTII,S$GLB,, | Performed by: STUDENT IN AN ORGANIZED HEALTH CARE EDUCATION/TRAINING PROGRAM

## 2018-06-22 RX ORDER — NIFEDIPINE 30 MG/1
30 TABLET, EXTENDED RELEASE ORAL
COMMUNITY
Start: 2018-06-08 | End: 2019-06-08

## 2018-06-22 RX ORDER — METRONIDAZOLE 500 MG/1
500 TABLET ORAL 2 TIMES DAILY
Qty: 14 TABLET | Refills: 0 | Status: SHIPPED | OUTPATIENT
Start: 2018-06-22 | End: 2018-06-29

## 2018-06-22 NOTE — Clinical Note
2 weeks post op RATLH/RSO/LS secondary to pelvic mass - called clinic with vaginal discharge.  Cuff intact and abdominal incisions healing well.  Started on Flagyl for BV.  Urine dip negative, culture pending.  Has an appt with on 7/9 for post op with GYN ONC.

## 2018-06-22 NOTE — PROGRESS NOTES
"Subjective:       George Lew is a 42 y.o. [id number] presents to the clinic 2 weeks status post RATLH/RSO/LS on 18 with Dr. Leonard secondary to pelvic mass.  She reports that 2 days ago she started to notice a foul smelling vaginal discharge.  She denies any vaginal bleeding.  She also reports constipation in that she has had 2 bowel movements since surgery and is straining.  She denies any chest pain, shortness of breath, palpitations, dizziness.  No fevers or chills.  No other issues or concerns. Pain is well controlled and patient is taking ibuprofen once daily.    The patient's allergies, and past medical and surgical histories were reviewed.    Review of Systems  Pertinent items are noted in HPI.      Objective:      /82   Ht 5' 3" (1.6 m)   Wt 64.9 kg (143 lb 3 oz)   LMP 2018 (Approximate) Comment: Robotic DVH  18  BMI 25.36 kg/m²      APPEARANCE: Well nourished, well developed, in no acute distress.  PSYCH: Appropriate mood and affect.  CARDIOVASCULAR:  Regular rate and rhythm.  LUNGS:  Clear to auscultation bilaterally.  ABDOMEN:  Soft, nontender.  Bowel sounds normal.  Trocar sites C/D/I and well healing.  GENITOURINARY:  External genitalia normal in appearance, normal perineal body, normal urethral meatus.  Vagina with yellow/brown discharge, malodorous, no blood.  Cuff well approximated.     EXTREMITIES: No edema.     Assessment:      Doing well postoperatively.  Operative findings again reviewed. Pathology report discussed.      Plan:      1. Continue any current medications.  2. Wound care discussed.  Postoperative care reviewed and discussed with patient.  3. Activity restrictions: no lifting more than 5 pounds.  Continue pelvic rest.  4. Bacterial vaginosis:  Will start Flagyl x 7 days.  Vaginal swab and urine culture collected - will follow up results.  5. Bowel regimen discussed with patient and all questions answered.  6.  Keep scheduled follow up with GYN Onc in " 2 weeks.  Patient will call if symptoms worsen or do not improve.  ED precautions reviewed.

## 2018-06-24 LAB
BACTERIA UR CULT: NORMAL
BACTERIA UR CULT: NORMAL

## 2018-06-25 ENCOUNTER — TELEPHONE (OUTPATIENT)
Dept: OBSTETRICS AND GYNECOLOGY | Facility: CLINIC | Age: 43
End: 2018-06-25

## 2018-06-25 NOTE — TELEPHONE ENCOUNTER
----- Message from Teresa Newman MD sent at 6/25/2018  4:58 PM CDT -----  Please call patient to see how she is doing.  Please let her know that her vaginal swab came back consistent with a bacterial infection that the medication we started on Friday should treat.  Please let me know if she has any questions or concerns.  Thank you!

## 2018-07-09 ENCOUNTER — OFFICE VISIT (OUTPATIENT)
Dept: GYNECOLOGIC ONCOLOGY | Facility: CLINIC | Age: 43
End: 2018-07-09
Payer: COMMERCIAL

## 2018-07-09 VITALS
WEIGHT: 144.19 LBS | HEIGHT: 63 IN | HEART RATE: 90 BPM | SYSTOLIC BLOOD PRESSURE: 151 MMHG | DIASTOLIC BLOOD PRESSURE: 96 MMHG | BODY MASS INDEX: 25.55 KG/M2

## 2018-07-09 DIAGNOSIS — Z98.890 S/P ROBOT-ASSISTED SURGICAL PROCEDURE: ICD-10-CM

## 2018-07-09 DIAGNOSIS — R19.00 PELVIC MASS IN FEMALE: Primary | ICD-10-CM

## 2018-07-09 PROCEDURE — 99999 PR PBB SHADOW E&M-EST. PATIENT-LVL II: CPT | Mod: PBBFAC,,, | Performed by: OBSTETRICS & GYNECOLOGY

## 2018-07-09 PROCEDURE — 99024 POSTOP FOLLOW-UP VISIT: CPT | Mod: S$GLB,,, | Performed by: OBSTETRICS & GYNECOLOGY

## 2018-07-09 NOTE — PROGRESS NOTES
Subjective:      Patient ID: George Lew is a 42 y.o. female.    Chief Complaint: Post-op Evaluation      HPI  Here today for f/u after RALH/RSO/LS for adenomyosis, explaining her elevated CA-125.  Has done well since surgery.  Denies F/C, N/V, VB.  Reports return of normal bladder and bowel function.  Of note, was treated for BV 2 weeks post-op  Review of Systems   Constitutional: Negative for activity change, appetite change, chills, fatigue and fever.   HENT: Negative for hearing loss, mouth sores, nosebleeds, sore throat and tinnitus.    Eyes: Negative for visual disturbance.   Respiratory: Negative for cough, chest tightness, shortness of breath and wheezing.    Cardiovascular: Negative for chest pain and leg swelling.   Gastrointestinal: Negative for abdominal distention, abdominal pain, blood in stool, constipation, diarrhea, nausea and vomiting.   Genitourinary: Positive for menstrual problem and pelvic pain. Negative for dysuria, flank pain, frequency, hematuria, vaginal bleeding, vaginal discharge and vaginal pain.   Musculoskeletal: Negative for arthralgias and back pain.   Skin: Negative for rash.   Neurological: Negative for dizziness, seizures, syncope, weakness and numbness.   Hematological: Does not bruise/bleed easily.   Psychiatric/Behavioral: Negative for confusion and sleep disturbance. The patient is not nervous/anxious.        Objective:   Physical Exam:   Constitutional: She is oriented to person, place, and time. She appears well-developed and well-nourished. No distress.    HENT:   Head: Normocephalic and atraumatic.    Eyes: No scleral icterus.    Neck: Normal range of motion. Neck supple.    Cardiovascular: Normal rate and intact distal pulses.  Exam reveals no cyanosis and no edema.     Pulmonary/Chest: Effort normal. No respiratory distress. She exhibits no tenderness.        Abdominal: Soft. Normal appearance. She exhibits no distension (incisions healing well), no fluid  wave, no ascites and no mass. There is no tenderness. There is no rigidity, no rebound and no guarding. No hernia.         Genitourinary: Rectum normal and vagina normal. Pelvic exam was performed with patient supine. There is no rash, tenderness or lesion on the right labia. There is no rash, tenderness or lesion on the left labia. Uterus is absent. There is an absent adnexa. Right adnexum displays no mass, no tenderness and no fullness. Left adnexum displays no mass, no tenderness and no fullness. No bleeding (cuff healing well) or unspecified prolapse of vaginal walls in the vagina. No vaginal discharge found. Vaginal cuff normal.Labial bartholins normal.Cervix exhibits absence.        Uterus Size: 11 cm   Musculoskeletal: Normal range of motion and moves all extremeties. She exhibits no edema.      Lymphadenopathy:     She has no cervical adenopathy.        Right: No inguinal adenopathy present.        Left: No inguinal adenopathy present.    Neurological: She is alert and oriented to person, place, and time.    Skin: Skin is warm. No rash noted. No cyanosis or erythema.    Psychiatric: She has a normal mood and affect. Thought content normal.       Assessment:     1. Pelvic mass in female    2. S/P robot-assisted surgical procedure (-[facility] RSO/LS)        Plan:       Doing well post-op.  Glad she had surgery.  Path as expected.  RTC prn. Ok to return to work.

## 2018-08-31 ENCOUNTER — TELEPHONE (OUTPATIENT)
Dept: OBSTETRICS AND GYNECOLOGY | Facility: CLINIC | Age: 43
End: 2018-08-31

## 2018-08-31 ENCOUNTER — TELEPHONE (OUTPATIENT)
Dept: GYNECOLOGIC ONCOLOGY | Facility: CLINIC | Age: 43
End: 2018-08-31

## 2018-08-31 NOTE — TELEPHONE ENCOUNTER
----- Message from Tona Gastelum sent at 8/31/2018  4:47 PM CDT -----  DOMINGO Castellanos [[id number]] said she needs a return to work letter/needs to state she needs light duty     Pt can be reached at

## 2018-08-31 NOTE — TELEPHONE ENCOUNTER
Spoke with pt. Pt states she needs a letter releasing her from light duty as of September 1, 2018. Pt advised message will be forward to J[place] upon her return on Tuesday. She voiced understanding

## 2018-09-04 ENCOUNTER — TELEPHONE (OUTPATIENT)
Dept: GYNECOLOGIC ONCOLOGY | Facility: CLINIC | Age: 43
End: 2018-09-04

## 2018-09-04 NOTE — TELEPHONE ENCOUNTER
Spoke to pt. Pt states she will come in later today to  return to work letter. Informed pt that the office is open until 5 pm. Pt said okay.  MA/DIANE

## 2018-09-14 ENCOUNTER — TELEPHONE (OUTPATIENT)
Dept: GYNECOLOGIC ONCOLOGY | Facility: CLINIC | Age: 43
End: 2018-09-14

## 2018-11-13 ENCOUNTER — TELEPHONE (OUTPATIENT)
Dept: OBSTETRICS AND GYNECOLOGY | Facility: CLINIC | Age: 43
End: 2018-11-13

## 2018-11-13 NOTE — TELEPHONE ENCOUNTER
Spoke w/ pt. States her company is giving her a hard time & saying she has occurences from April 21-27th which is when she was out for medical leave for her hysterectomy (with Aisha).   No one told her this until she is transferring positions.   She is now requesting a letter stating these dates & that she was out on medical leave.   She wants to pick it up in the [place] office asap. I notified her  is not back in the office until tomorrow (Wed).  I would discuss with her.

## 2018-11-13 NOTE — TELEPHONE ENCOUNTER
Pt called to speak with Noni regarding her medical leave from April. Pt would not go into further detail.

## 2018-11-15 NOTE — TELEPHONE ENCOUNTER
Created letter stating pt. Was under our care from April 21-27 & then underwent surgery on 6-7-18.  Will  letter in [place] on Friday after 11am

## 2018-11-16 ENCOUNTER — TELEPHONE (OUTPATIENT)
Dept: OBSTETRICS AND GYNECOLOGY | Facility: CLINIC | Age: 43
End: 2018-11-16

## 2018-11-16 NOTE — TELEPHONE ENCOUNTER
Pt states that she needs letter faxed to [facility]. Pt came in today to  a copy of the letter as well. Pt's # [phone]

## 2019-10-01 NOTE — TELEPHONE ENCOUNTER
Bone pt- States she was seen in the ED this weekend for bleeding. She was prescribed Provera in the ED and she is to take 20mg PO TID for seven days, The patient states the bleeding has slowed, but not stopped. She has only taken two doses. Advised pt to continue taking the Provera as prescribed, pt agrees but seems hesitant. She jumps from thought to thought frequently during the conversation about taking off of work, FMLA paper work, if she should have sx or not, if a recovery room is included. She mentions frequently she cannot afford to keep taking off of work and she wants to keep her job.   She ONLY wants Dr. Palomares to call her back to discuss her options. She wants to discuss if you would do her Sx or Dr. Leonard should.     Patient has been encouraged to have a hyst several times.    RN attempted to call pt for post partum follow up questions. Infant heard crying over the phone, pt hung up.

## (undated) DEVICE — SUT MCRYL PLUS 4-0 PS2 27IN

## (undated) DEVICE — GLOVE PROTEXIS NEOPRENE 7.5

## (undated) DEVICE — IRRIGATOR ENDOSCOPY DISP.

## (undated) DEVICE — DRAPE COLUMN DAVINCI XI

## (undated) DEVICE — COVER TIP CURVED SCISSORS XI

## (undated) DEVICE — UNDERGLOVES BIOGEL PI SZ 7 LF

## (undated) DEVICE — SOL WATER STRL IRR 1000ML

## (undated) DEVICE — APPLICATOR ARISTA FLEX XL

## (undated) DEVICE — SOL ELECTROLUBE ANTI-STIC

## (undated) DEVICE — SEAL UNIVERSAL 5MM-8MM XI

## (undated) DEVICE — NDL INSUF ULTRA VERESS 120MM

## (undated) DEVICE — KIT WING PAD POSITIONING

## (undated) DEVICE — POSITIONER HEAD ADULT

## (undated) DEVICE — SET TRI-LUMEN FILTERED TUBE

## (undated) DEVICE — DRAPE ARM DAVINCI XI

## (undated) DEVICE — APPLICATOR LAPAROSCOPIC EXTEND

## (undated) DEVICE — SOL NS 1000CC

## (undated) DEVICE — ELECTRODE REM PLYHSV RETURN 9

## (undated) DEVICE — INSERT CUSHIONPRONE VIEW LARGE

## (undated) DEVICE — DEVICE ANC SW STAT FOLEY 6-24

## (undated) DEVICE — SEE MEDLINE ITEM 152622

## (undated) DEVICE — SUT VICRYL 0 27 CT-2

## (undated) DEVICE — GLOVE BIOGEL SKINSENSE PI 7.0

## (undated) DEVICE — PORT ACCESS 8MM W/120MM LOW

## (undated) DEVICE — OBTURATOR BLADELESS 8MM XI

## (undated) DEVICE — SOL CLEARIFY VISUALIZATION LAP

## (undated) DEVICE — MANIPULATOR VCARE PLUS 37MM LG

## (undated) DEVICE — SEE MEDLINE ITEM 156923

## (undated) DEVICE — POWDER ARISTA AH 3G

## (undated) DEVICE — SYR 10CC LUER LOCK

## (undated) DEVICE — JELLY KY LUBRICATING 5G PACKET